# Patient Record
Sex: FEMALE | Race: ASIAN | Employment: OTHER | URBAN - METROPOLITAN AREA
[De-identification: names, ages, dates, MRNs, and addresses within clinical notes are randomized per-mention and may not be internally consistent; named-entity substitution may affect disease eponyms.]

---

## 2017-02-13 PROBLEM — R06.00 DOE (DYSPNEA ON EXERTION): Status: ACTIVE | Noted: 2017-02-13

## 2017-02-13 PROBLEM — R06.09 DOE (DYSPNEA ON EXERTION): Status: ACTIVE | Noted: 2017-02-13

## 2017-02-13 PROBLEM — R09.89 LABILE HYPERTENSION: Status: ACTIVE | Noted: 2017-02-13

## 2017-04-04 PROBLEM — L84 CALLUS OF FOOT: Status: ACTIVE | Noted: 2017-04-04

## 2017-09-27 PROCEDURE — 82570 ASSAY OF URINE CREATININE: CPT | Performed by: INTERNAL MEDICINE

## 2017-09-27 PROCEDURE — 82043 UR ALBUMIN QUANTITATIVE: CPT | Performed by: INTERNAL MEDICINE

## 2018-04-08 ENCOUNTER — APPOINTMENT (OUTPATIENT)
Dept: GENERAL RADIOLOGY | Age: 73
End: 2018-04-08
Attending: EMERGENCY MEDICINE
Payer: MEDICARE

## 2018-04-08 ENCOUNTER — HOSPITAL ENCOUNTER (EMERGENCY)
Age: 73
Discharge: HOME | End: 2018-04-08
Attending: EMERGENCY MEDICINE
Payer: MEDICARE

## 2018-04-08 VITALS
HEIGHT: 59 IN | SYSTOLIC BLOOD PRESSURE: 155 MMHG | DIASTOLIC BLOOD PRESSURE: 67 MMHG | OXYGEN SATURATION: 93 % | BODY MASS INDEX: 24.39 KG/M2 | TEMPERATURE: 99 F | HEART RATE: 88 BPM | WEIGHT: 121 LBS | RESPIRATION RATE: 18 BRPM

## 2018-04-08 DIAGNOSIS — J45.901 EXACERBATION OF ASTHMA, UNSPECIFIED ASTHMA SEVERITY, UNSPECIFIED WHETHER PERSISTENT: Primary | ICD-10-CM

## 2018-04-08 DIAGNOSIS — R03.0 BLOOD PRESSURE ELEVATED WITHOUT HISTORY OF HTN: ICD-10-CM

## 2018-04-08 DIAGNOSIS — E87.1 HYPONATREMIA: ICD-10-CM

## 2018-04-08 PROCEDURE — 87040 BLOOD CULTURE FOR BACTERIA: CPT | Performed by: EMERGENCY MEDICINE

## 2018-04-08 PROCEDURE — 84484 ASSAY OF TROPONIN QUANT: CPT | Performed by: EMERGENCY MEDICINE

## 2018-04-08 PROCEDURE — 94640 AIRWAY INHALATION TREATMENT: CPT

## 2018-04-08 PROCEDURE — 99285 EMERGENCY DEPT VISIT HI MDM: CPT

## 2018-04-08 PROCEDURE — 96374 THER/PROPH/DIAG INJ IV PUSH: CPT

## 2018-04-08 PROCEDURE — 94664 DEMO&/EVAL PT USE INHALER: CPT

## 2018-04-08 PROCEDURE — 85025 COMPLETE CBC W/AUTO DIFF WBC: CPT | Performed by: EMERGENCY MEDICINE

## 2018-04-08 PROCEDURE — 93005 ELECTROCARDIOGRAM TRACING: CPT

## 2018-04-08 PROCEDURE — 83880 ASSAY OF NATRIURETIC PEPTIDE: CPT | Performed by: EMERGENCY MEDICINE

## 2018-04-08 PROCEDURE — 85610 PROTHROMBIN TIME: CPT | Performed by: EMERGENCY MEDICINE

## 2018-04-08 PROCEDURE — 71045 X-RAY EXAM CHEST 1 VIEW: CPT | Performed by: EMERGENCY MEDICINE

## 2018-04-08 PROCEDURE — 85730 THROMBOPLASTIN TIME PARTIAL: CPT | Performed by: EMERGENCY MEDICINE

## 2018-04-08 PROCEDURE — 80053 COMPREHEN METABOLIC PANEL: CPT | Performed by: EMERGENCY MEDICINE

## 2018-04-08 PROCEDURE — 93010 ELECTROCARDIOGRAM REPORT: CPT

## 2018-04-08 PROCEDURE — 36415 COLL VENOUS BLD VENIPUNCTURE: CPT

## 2018-04-08 RX ORDER — METHYLPREDNISOLONE SODIUM SUCCINATE 125 MG/2ML
125 INJECTION, POWDER, LYOPHILIZED, FOR SOLUTION INTRAMUSCULAR; INTRAVENOUS ONCE
Status: COMPLETED | OUTPATIENT
Start: 2018-04-08 | End: 2018-04-08

## 2018-04-08 RX ORDER — IPRATROPIUM BROMIDE AND ALBUTEROL SULFATE 2.5; .5 MG/3ML; MG/3ML
3 SOLUTION RESPIRATORY (INHALATION) ONCE
Status: COMPLETED | OUTPATIENT
Start: 2018-04-08 | End: 2018-04-08

## 2018-04-08 RX ORDER — ACETAMINOPHEN 500 MG
1000 TABLET ORAL ONCE
Status: COMPLETED | OUTPATIENT
Start: 2018-04-08 | End: 2018-04-08

## 2018-04-08 RX ORDER — CEPHALEXIN 500 MG/1
500 CAPSULE ORAL 4 TIMES DAILY
COMMUNITY
End: 2018-04-23

## 2018-04-08 RX ORDER — ALBUTEROL SULFATE 2.5 MG/3ML
2.5 SOLUTION RESPIRATORY (INHALATION) EVERY 4 HOURS PRN
Qty: 30 AMPULE | Refills: 0 | Status: SHIPPED | OUTPATIENT
Start: 2018-04-08 | End: 2018-05-08

## 2018-04-08 NOTE — ED PROVIDER NOTES
Patient Seen in: THE [facility] Emergency Department In [place]    History   Patient presents with:  Dyspnea SARAH SOB (respiratory)    Stated Complaint: shortness of breath    HPI    19-year-old female with history of asthma presents emergency with chief compl Systems    Positive for stated complaint: shortness of breath  Other systems are as noted in HPI. Constitutional and vital signs reviewed. All other systems reviewed and negative except as noted above.     Physical Exam   ED Triage Vitals [04/08/18 15 PROTHROMBIN TIME (PT) - Normal   PTT, ACTIVATED - Normal    Narrative: The aPTT Heparin Therapeutic Range is approximately 65- 104 seconds. The therapeutic range has been validated against 0.3-0.7 heparin anti-Xa units/mL.      CBC WITH DIFFERENTIAL W asthma severity, unspecified whether persistent  (primary encounter diagnosis)  Blood pressure elevated without history of HTN  Hyponatremia    Disposition:  Discharge  4/8/2018  7:28 pm    Follow-up:  Yane Escalante MD  [address]  [address]  [place]

## 2018-04-08 NOTE — ED INITIAL ASSESSMENT (HPI)
Came in today for SOB-- has had some SOB following URI for about 1 month-- worsening over last 3-4 days with wheezing-- also noticed increase BP over last few days

## 2018-07-06 ENCOUNTER — HOSPITAL ENCOUNTER (EMERGENCY)
Age: 73
Discharge: HOME | End: 2018-07-06
Attending: EMERGENCY MEDICINE
Payer: MEDICARE

## 2018-07-06 ENCOUNTER — APPOINTMENT (OUTPATIENT)
Dept: CT IMAGING | Age: 73
End: 2018-07-06
Attending: EMERGENCY MEDICINE
Payer: MEDICARE

## 2018-07-06 ENCOUNTER — APPOINTMENT (OUTPATIENT)
Dept: GENERAL RADIOLOGY | Age: 73
End: 2018-07-06
Attending: EMERGENCY MEDICINE
Payer: MEDICARE

## 2018-07-06 VITALS
BODY MASS INDEX: 26.21 KG/M2 | TEMPERATURE: 99 F | DIASTOLIC BLOOD PRESSURE: 73 MMHG | OXYGEN SATURATION: 98 % | HEIGHT: 59 IN | SYSTOLIC BLOOD PRESSURE: 179 MMHG | RESPIRATION RATE: 15 BRPM | HEART RATE: 70 BPM | WEIGHT: 130 LBS

## 2018-07-06 DIAGNOSIS — S09.90XA INJURY OF HEAD, INITIAL ENCOUNTER: ICD-10-CM

## 2018-07-06 DIAGNOSIS — R55 SYNCOPE AND COLLAPSE: ICD-10-CM

## 2018-07-06 DIAGNOSIS — S40.012A CONTUSION OF LEFT SHOULDER, INITIAL ENCOUNTER: ICD-10-CM

## 2018-07-06 DIAGNOSIS — S93.409A MODERATE ANKLE SPRAIN, UNSPECIFIED LATERALITY, INITIAL ENCOUNTER: Primary | ICD-10-CM

## 2018-07-06 LAB
ALBUMIN SERPL-MCNC: 3.2 G/DL (ref 3.5–4.8)
ALP LIVER SERPL-CCNC: 87 U/L (ref 55–142)
ALT SERPL-CCNC: 36 U/L (ref 14–54)
AST SERPL-CCNC: 33 U/L (ref 15–41)
ATRIAL RATE: 77 BPM
BASOPHILS # BLD AUTO: 0.03 X10(3) UL (ref 0–0.1)
BASOPHILS NFR BLD AUTO: 0.6 %
BILIRUB SERPL-MCNC: 0.3 MG/DL (ref 0.1–2)
BUN BLD-MCNC: 12 MG/DL (ref 8–20)
CALCIUM BLD-MCNC: 8.3 MG/DL (ref 8.3–10.3)
CHLORIDE: 97 MMOL/L (ref 101–111)
CO2: 29 MMOL/L (ref 22–32)
CREAT BLD-MCNC: 0.73 MG/DL (ref 0.55–1.02)
EOSINOPHIL # BLD AUTO: 0.09 X10(3) UL (ref 0–0.3)
EOSINOPHIL NFR BLD AUTO: 1.9 %
ERYTHROCYTE [DISTWIDTH] IN BLOOD BY AUTOMATED COUNT: 13.6 % (ref 11.5–16)
GLUCOSE BLD-MCNC: 110 MG/DL (ref 70–99)
HCT VFR BLD AUTO: 30.8 % (ref 34–50)
HGB BLD-MCNC: 9.9 G/DL (ref 12–16)
IMMATURE GRANULOCYTE COUNT: 0.01 X10(3) UL (ref 0–1)
IMMATURE GRANULOCYTE RATIO %: 0.2 %
LYMPHOCYTES # BLD AUTO: 0.89 X10(3) UL (ref 0.9–4)
LYMPHOCYTES NFR BLD AUTO: 18.9 %
M PROTEIN MFR SERPL ELPH: 6.3 G/DL (ref 6.1–8.3)
MCH RBC QN AUTO: 26.2 PG (ref 27–33.2)
MCHC RBC AUTO-ENTMCNC: 32.1 G/DL (ref 31–37)
MCV RBC AUTO: 81.5 FL (ref 81–100)
MONOCYTES # BLD AUTO: 0.61 X10(3) UL (ref 0.1–1)
MONOCYTES NFR BLD AUTO: 13 %
NEUTROPHIL ABS PRELIM: 3.08 X10 (3) UL (ref 1.3–6.7)
NEUTROPHILS # BLD AUTO: 3.08 X10(3) UL (ref 1.3–6.7)
NEUTROPHILS NFR BLD AUTO: 65.4 %
P AXIS: 71 DEGREES
P-R INTERVAL: 164 MS
PLATELET # BLD AUTO: 212 10(3)UL (ref 150–450)
POTASSIUM SERPL-SCNC: 4.4 MMOL/L (ref 3.6–5.1)
Q-T INTERVAL: 386 MS
QRS DURATION: 74 MS
QTC CALCULATION (BEZET): 436 MS
R AXIS: 39 DEGREES
RBC # BLD AUTO: 3.78 X10(6)UL (ref 3.8–5.1)
RED CELL DISTRIBUTION WIDTH-SD: 40.5 FL (ref 35.1–46.3)
SODIUM SERPL-SCNC: 130 MMOL/L (ref 136–144)
T AXIS: 56 DEGREES
TROPONIN: <0.046 NG/ML (ref ?–0.05)
VENTRICULAR RATE: 77 BPM
WBC # BLD AUTO: 4.7 X10(3) UL (ref 4–13)

## 2018-07-06 PROCEDURE — 36415 COLL VENOUS BLD VENIPUNCTURE: CPT

## 2018-07-06 PROCEDURE — 70450 CT HEAD/BRAIN W/O DYE: CPT | Performed by: EMERGENCY MEDICINE

## 2018-07-06 PROCEDURE — 85025 COMPLETE CBC W/AUTO DIFF WBC: CPT | Performed by: EMERGENCY MEDICINE

## 2018-07-06 PROCEDURE — 99285 EMERGENCY DEPT VISIT HI MDM: CPT

## 2018-07-06 PROCEDURE — 93010 ELECTROCARDIOGRAM REPORT: CPT

## 2018-07-06 PROCEDURE — 84484 ASSAY OF TROPONIN QUANT: CPT

## 2018-07-06 PROCEDURE — 85025 COMPLETE CBC W/AUTO DIFF WBC: CPT

## 2018-07-06 PROCEDURE — 84484 ASSAY OF TROPONIN QUANT: CPT | Performed by: EMERGENCY MEDICINE

## 2018-07-06 PROCEDURE — 80053 COMPREHEN METABOLIC PANEL: CPT

## 2018-07-06 PROCEDURE — 73610 X-RAY EXAM OF ANKLE: CPT | Performed by: EMERGENCY MEDICINE

## 2018-07-06 PROCEDURE — 73030 X-RAY EXAM OF SHOULDER: CPT | Performed by: EMERGENCY MEDICINE

## 2018-07-06 PROCEDURE — 93005 ELECTROCARDIOGRAM TRACING: CPT

## 2018-07-06 PROCEDURE — 80053 COMPREHEN METABOLIC PANEL: CPT | Performed by: EMERGENCY MEDICINE

## 2018-07-06 NOTE — ED INITIAL ASSESSMENT (HPI)
FELL THREE DAYS AGO WHILE OUT OF COUNTRY. STATES WAS OUTSIDE AND HAD A POSSIBLE SYNCOPAL EPISODE. REPORTS LEFT SHOULDER, RIGHT FOOT AND ANKLE PAIN AND CONTINUED FEELING OF TIRED.

## 2018-07-06 NOTE — ED PROVIDER NOTES
Patient Seen in: [facility] Emergency Department In [place]    History   Patient presents with:  Fall (musculoskeletal, neurologic)    Stated Complaint: fall 3 days ago - possible syncope    HPI    28-year-old female presents to the emergency room reporti SURGICAL HISTORY      Comment: left knee  No date: TOTAL ABDOM HYSTERECTOMY        Smoking status: Never Smoker                                                              Smokeless tobacco: Never Used                      Alcohol use:  No                R CBC W/ DIFFERENTIAL - Abnormal; Notable for the following:     RBC 3.78 (*)     HGB 9.9 (*)     HCT 30.8 (*)     MCH 26.2 (*)     Lymphocyte Absolute 0.89 (*)     All other components within normal limits   TROPONIN I - Normal   CBC WITH DIFFERENTIAL WIT on 7/06/2018 at 13:34     Approved by: Patricia Beauchamp MD            Xr Ankle (min 3 Views), Right (cpt=73610)    Result Date: 7/6/2018  PROCEDURE:  XR ANKLE (MIN 3 VIEWS) RIGHT (CPT=73610)  TECHNIQUE:  Three views were obtained.   COMPARISON:  JUAN mild degree. No soft tissue abnormalities. CONCLUSION:  Mild osteoarthritic changes are present. No acute fracture or other acute bony process.    Dictated by: Velma Lowe MD on 7/06/2018 at 13:37     Approved by: Velma Lowe MD            Ct mild hyponatremia. Patient instructed to follow-up with PCP to have this reevaluated in 1 week. No concerning finding on labs, imaging, examination. Patient was improving to [place] on [place] beach feeling overheated when she then lost consciousness.   No e

## 2019-06-30 ENCOUNTER — HOSPITAL ENCOUNTER (OUTPATIENT)
Dept: ULTRASOUND IMAGING | Age: 74
Discharge: HOME | End: 2019-06-30
Attending: INTERNAL MEDICINE
Payer: MEDICARE

## 2019-06-30 DIAGNOSIS — E04.9 GOITER: ICD-10-CM

## 2019-06-30 PROCEDURE — 76536 US EXAM OF HEAD AND NECK: CPT | Performed by: INTERNAL MEDICINE

## 2020-02-18 PROBLEM — R09.89 LABILE HYPERTENSION: Status: RESOLVED | Noted: 2017-02-13 | Resolved: 2020-02-18

## 2020-06-19 ENCOUNTER — HOSPITAL ENCOUNTER (OUTPATIENT)
Dept: MRI IMAGING | Age: 75
Discharge: HOME | End: 2020-06-19
Attending: ORTHOPAEDIC SURGERY
Payer: MEDICARE

## 2020-06-19 DIAGNOSIS — M75.121 COMPLETE ROTATOR CUFF TEAR OR RUPTURE OF RIGHT SHOULDER, NOT SPECIFIED AS TRAUMATIC: ICD-10-CM

## 2020-06-19 DIAGNOSIS — M54.12 BRACHIAL NEURITIS: ICD-10-CM

## 2020-06-19 PROCEDURE — 73221 MRI JOINT UPR EXTREM W/O DYE: CPT | Performed by: ORTHOPAEDIC SURGERY

## 2020-06-19 PROCEDURE — 72141 MRI NECK SPINE W/O DYE: CPT | Performed by: ORTHOPAEDIC SURGERY

## 2020-11-25 PROBLEM — F41.9 ANXIETY: Status: ACTIVE | Noted: 2020-11-25

## 2020-11-25 PROBLEM — E11.41 DIABETIC MONONEUROPATHY ASSOCIATED WITH TYPE 2 DIABETES MELLITUS (HCC): Status: ACTIVE | Noted: 2020-11-25

## 2021-04-06 ENCOUNTER — ORDER TRANSCRIPTION (OUTPATIENT)
Dept: ADMINISTRATIVE | Facility: HOSPITAL | Age: 76
End: 2021-04-06

## 2021-04-06 DIAGNOSIS — E78.2 MIXED HYPERLIPIDEMIA: ICD-10-CM

## 2021-04-06 DIAGNOSIS — R06.00 DYSPNEA ON EXERTION: ICD-10-CM

## 2021-04-06 DIAGNOSIS — R09.89 LABILE HYPERTENSION: Primary | ICD-10-CM

## 2021-05-04 ENCOUNTER — APPOINTMENT (OUTPATIENT)
Dept: GENERAL RADIOLOGY | Facility: HOSPITAL | Age: 76
End: 2021-05-04
Attending: EMERGENCY MEDICINE
Payer: MEDICARE

## 2021-05-04 ENCOUNTER — HOSPITAL ENCOUNTER (EMERGENCY)
Facility: HOSPITAL | Age: 76
Discharge: HOME | End: 2021-05-04
Attending: EMERGENCY MEDICINE
Payer: MEDICARE

## 2021-05-04 ENCOUNTER — APPOINTMENT (OUTPATIENT)
Dept: CT IMAGING | Facility: HOSPITAL | Age: 76
End: 2021-05-04
Attending: EMERGENCY MEDICINE
Payer: MEDICARE

## 2021-05-04 VITALS
DIASTOLIC BLOOD PRESSURE: 64 MMHG | RESPIRATION RATE: 20 BRPM | SYSTOLIC BLOOD PRESSURE: 120 MMHG | OXYGEN SATURATION: 96 % | WEIGHT: 125 LBS | TEMPERATURE: 98 F | BODY MASS INDEX: 25 KG/M2 | HEART RATE: 72 BPM

## 2021-05-04 DIAGNOSIS — R06.02 SHORTNESS OF BREATH: Primary | ICD-10-CM

## 2021-05-04 DIAGNOSIS — G44.209 ACUTE NON INTRACTABLE TENSION-TYPE HEADACHE: ICD-10-CM

## 2021-05-04 DIAGNOSIS — R53.83 FATIGUE, UNSPECIFIED TYPE: ICD-10-CM

## 2021-05-04 PROCEDURE — 84443 ASSAY THYROID STIM HORMONE: CPT | Performed by: EMERGENCY MEDICINE

## 2021-05-04 PROCEDURE — 93010 ELECTROCARDIOGRAM REPORT: CPT

## 2021-05-04 PROCEDURE — 71045 X-RAY EXAM CHEST 1 VIEW: CPT | Performed by: EMERGENCY MEDICINE

## 2021-05-04 PROCEDURE — 99284 EMERGENCY DEPT VISIT MOD MDM: CPT

## 2021-05-04 PROCEDURE — 93005 ELECTROCARDIOGRAM TRACING: CPT

## 2021-05-04 PROCEDURE — 85379 FIBRIN DEGRADATION QUANT: CPT | Performed by: EMERGENCY MEDICINE

## 2021-05-04 PROCEDURE — 81003 URINALYSIS AUTO W/O SCOPE: CPT | Performed by: EMERGENCY MEDICINE

## 2021-05-04 PROCEDURE — 70450 CT HEAD/BRAIN W/O DYE: CPT | Performed by: EMERGENCY MEDICINE

## 2021-05-04 PROCEDURE — 99285 EMERGENCY DEPT VISIT HI MDM: CPT

## 2021-05-04 PROCEDURE — 85025 COMPLETE CBC W/AUTO DIFF WBC: CPT | Performed by: EMERGENCY MEDICINE

## 2021-05-04 PROCEDURE — 80053 COMPREHEN METABOLIC PANEL: CPT | Performed by: EMERGENCY MEDICINE

## 2021-05-04 PROCEDURE — 84484 ASSAY OF TROPONIN QUANT: CPT | Performed by: EMERGENCY MEDICINE

## 2021-05-04 RX ORDER — ACETAMINOPHEN 325 MG/1
650 TABLET ORAL ONCE
Status: COMPLETED | OUTPATIENT
Start: 2021-05-04 | End: 2021-05-04

## 2021-05-04 NOTE — ED PROVIDER NOTES
Patient Seen in: [facility] Emergency Department      History   Patient presents with:  Difficulty Breathing    Stated Complaint: SOB, increasing over the last few weeks    HPI/Subjective:   HPI    41-year-old female presents to the emergency depart - DMG  6/13    irregular Z line   • HYSTERECTOMY  2001    Complete    • OTHER SURGICAL HISTORY      left knee   • TOTAL ABDOM HYSTERECTOMY                  Social History    Tobacco Use      Smoking status: Never Smoker      Smokeless tobacco: Never Used to person, place, and time. Cranial Nerves: No cranial nerve deficit. Sensory: No sensory deficit. Coordination: Coordination normal.      Deep Tendon Reflexes: Reflexes are normal and symmetric.               ED Course     Labs Reviewed   CO is transmitted to the [facility] ([facility]) [address] ([address]) which includes the Dose Index Registry.   PATIENT STATED HISTORY: (As transcribed by Technologist)  Headache    FINDINGS:  VENTRICLES/SULCI:  Ventricles and patrick ID, ED, PLAX                           4.0   cm     ---------- 3.9 - 5.3  LV ID, ES, PLAX                           2.8   cm     ---------- ---------  LV fx shortening, PLAX                    29    %      ---------- 27 - 45  LV mid-wall fx shortening, ANGELIC 0.74  m/sec  0.81       ---------  Mitral deceleration time                  148   ms     194        ---------  Mitral peak gradient, D                   5     mm Hg  ---------- ---------  Mitral E/A ratio, peak                    1.6 annulus. Mildly thickened leaflets. Doppler:  Transvalvular velocity is within the normal range. There is no evidence for stenosis. There is mild regurgitation. Tricuspid valve:   Structurally normal valve. Doppler: There is no evidence for stenosis. AP PORTABLE  (CPT=71045), 4/08/2018, 3:55 PM.  INDICATIONS:  SOB, increasing over the last few weeks  PATIENT STATED HISTORY: (As transcribed by Technologist)  Patient offered no additional history at this time.     FINDINGS:  Cardiac size and pulmonary vas acidosis is the etiology for any dyspnea. At this time I do not feel she requires emergent hospitalization.   Patient was discharged in good condition                             Disposition and Plan     Clinical Impression:  Shortness of breath  (primary

## 2021-05-17 NOTE — IMAGING NOTE
Pt scheduled for CT gated coronary. Discussed arrival time 0830. Instructed to hold all caffeine, eat meals, hydrate well with water, and take all prescribed meds. Pt verbalized understanding of all instructions.

## 2021-05-19 ENCOUNTER — HOSPITAL ENCOUNTER (OUTPATIENT)
Dept: CT IMAGING | Facility: HOSPITAL | Age: 76
Discharge: HOME | End: 2021-05-19
Attending: INTERNAL MEDICINE
Payer: MEDICARE

## 2021-05-19 VITALS — HEART RATE: 65 BPM | SYSTOLIC BLOOD PRESSURE: 105 MMHG | DIASTOLIC BLOOD PRESSURE: 61 MMHG

## 2021-05-19 VITALS — HEART RATE: 58 BPM | SYSTOLIC BLOOD PRESSURE: 136 MMHG | DIASTOLIC BLOOD PRESSURE: 55 MMHG

## 2021-05-19 DIAGNOSIS — R09.89 LABILE HYPERTENSION: ICD-10-CM

## 2021-05-19 DIAGNOSIS — R06.02 SHORTNESS OF BREATH: ICD-10-CM

## 2021-05-19 DIAGNOSIS — R07.9 INTERMITTENT CHEST PAIN: ICD-10-CM

## 2021-05-19 DIAGNOSIS — I34.0 NONRHEUMATIC MITRAL VALVE REGURGITATION: ICD-10-CM

## 2021-05-19 DIAGNOSIS — R06.00 DYSPNEA ON EXERTION: ICD-10-CM

## 2021-05-19 DIAGNOSIS — E78.2 MIXED HYPERLIPIDEMIA: ICD-10-CM

## 2021-05-19 DIAGNOSIS — R07.9 CHEST PAIN, UNSPECIFIED TYPE: ICD-10-CM

## 2021-05-19 PROCEDURE — 75574 CT ANGIO HRT W/3D IMAGE: CPT | Performed by: INTERNAL MEDICINE

## 2021-05-19 RX ORDER — DILTIAZEM HYDROCHLORIDE 5 MG/ML
INJECTION INTRAVENOUS
Status: DISCONTINUED
Start: 2021-05-19 | End: 2021-05-19 | Stop reason: WASHOUT

## 2021-05-19 RX ORDER — NITROGLYCERIN 0.4 MG/1
TABLET SUBLINGUAL
Status: COMPLETED
Start: 2021-05-19 | End: 2021-05-19

## 2021-05-19 RX ORDER — METOPROLOL TARTRATE 5 MG/5ML
INJECTION INTRAVENOUS
Status: COMPLETED
Start: 2021-05-19 | End: 2021-05-19

## 2021-05-19 RX ADMIN — METOPROLOL TARTRATE 5 MG: 5 INJECTION INTRAVENOUS at 09:56:00

## 2021-05-19 RX ADMIN — METOPROLOL TARTRATE 5 MG: 5 INJECTION INTRAVENOUS at 09:50:00

## 2021-05-19 RX ADMIN — NITROGLYCERIN 0.4 MG: 0.4 TABLET SUBLINGUAL at 10:05:00

## 2021-05-19 NOTE — IMAGING NOTE
Received Rahul Amaro Plunk to CT Rm 4 working with [address] [employer]. Verified name, , allergies. Pt denies use of long acting nitrates like, Imdur, Cialis, Levitra and Viagra. IV access with 20G angiocath to right antecubital area.  O2 applied via nasal can

## 2021-06-23 PROBLEM — H90.3 SENSORY HEARING LOSS, BILATERAL: Status: ACTIVE | Noted: 2021-06-23

## 2021-06-23 PROBLEM — H61.23 IMPACTED CERUMEN, BILATERAL: Status: ACTIVE | Noted: 2021-06-23

## 2021-06-26 ENCOUNTER — HOSPITAL ENCOUNTER (OUTPATIENT)
Dept: CT IMAGING | Facility: HOSPITAL | Age: 76
Discharge: HOME | End: 2021-06-26
Attending: INTERNAL MEDICINE
Payer: MEDICARE

## 2021-06-26 ENCOUNTER — LAB ENCOUNTER (OUTPATIENT)
Dept: LAB | Facility: HOSPITAL | Age: 76
End: 2021-06-26
Attending: INTERNAL MEDICINE
Payer: MEDICARE

## 2021-06-26 DIAGNOSIS — R06.00 DYSPNEA, UNSPECIFIED TYPE: ICD-10-CM

## 2021-06-26 DIAGNOSIS — R09.89 LABILE HYPERTENSION: ICD-10-CM

## 2021-06-26 PROCEDURE — 36415 COLL VENOUS BLD VENIPUNCTURE: CPT

## 2021-06-26 PROCEDURE — 80048 BASIC METABOLIC PNL TOTAL CA: CPT

## 2021-06-26 PROCEDURE — 71250 CT THORAX DX C-: CPT | Performed by: INTERNAL MEDICINE

## 2021-12-08 ENCOUNTER — HOSPITAL ENCOUNTER (EMERGENCY)
Age: 76
Discharge: HOME | End: 2021-12-08
Attending: EMERGENCY MEDICINE
Payer: MEDICARE

## 2021-12-08 ENCOUNTER — APPOINTMENT (OUTPATIENT)
Dept: CT IMAGING | Age: 76
End: 2021-12-08
Attending: PHYSICIAN ASSISTANT
Payer: MEDICARE

## 2021-12-08 VITALS
HEIGHT: 59 IN | HEART RATE: 64 BPM | TEMPERATURE: 98 F | BODY MASS INDEX: 25.2 KG/M2 | DIASTOLIC BLOOD PRESSURE: 69 MMHG | SYSTOLIC BLOOD PRESSURE: 144 MMHG | RESPIRATION RATE: 16 BRPM | OXYGEN SATURATION: 99 % | WEIGHT: 125 LBS

## 2021-12-08 DIAGNOSIS — N39.0 URINARY TRACT INFECTION WITH HEMATURIA, SITE UNSPECIFIED: Primary | ICD-10-CM

## 2021-12-08 DIAGNOSIS — N21.1 URETHRAL STONE: ICD-10-CM

## 2021-12-08 DIAGNOSIS — E87.1 HYPONATREMIA: ICD-10-CM

## 2021-12-08 DIAGNOSIS — R31.9 URINARY TRACT INFECTION WITH HEMATURIA, SITE UNSPECIFIED: Primary | ICD-10-CM

## 2021-12-08 PROCEDURE — 81015 MICROSCOPIC EXAM OF URINE: CPT | Performed by: PHYSICIAN ASSISTANT

## 2021-12-08 PROCEDURE — 87086 URINE CULTURE/COLONY COUNT: CPT | Performed by: PHYSICIAN ASSISTANT

## 2021-12-08 PROCEDURE — 81001 URINALYSIS AUTO W/SCOPE: CPT | Performed by: PHYSICIAN ASSISTANT

## 2021-12-08 PROCEDURE — 99284 EMERGENCY DEPT VISIT MOD MDM: CPT

## 2021-12-08 PROCEDURE — 96361 HYDRATE IV INFUSION ADD-ON: CPT

## 2021-12-08 PROCEDURE — 74177 CT ABD & PELVIS W/CONTRAST: CPT | Performed by: PHYSICIAN ASSISTANT

## 2021-12-08 PROCEDURE — 85025 COMPLETE CBC W/AUTO DIFF WBC: CPT | Performed by: PHYSICIAN ASSISTANT

## 2021-12-08 PROCEDURE — 96365 THER/PROPH/DIAG IV INF INIT: CPT

## 2021-12-08 PROCEDURE — 82962 GLUCOSE BLOOD TEST: CPT

## 2021-12-08 PROCEDURE — 80053 COMPREHEN METABOLIC PANEL: CPT | Performed by: PHYSICIAN ASSISTANT

## 2021-12-08 RX ORDER — SODIUM CHLORIDE 9 MG/ML
INJECTION, SOLUTION INTRAVENOUS CONTINUOUS
Status: DISCONTINUED | OUTPATIENT
Start: 2021-12-08 | End: 2021-12-08

## 2021-12-08 RX ORDER — SULFAMETHOXAZOLE AND TRIMETHOPRIM 800; 160 MG/1; MG/1
1 TABLET ORAL 2 TIMES DAILY
Qty: 20 TABLET | Refills: 0 | Status: SHIPPED | OUTPATIENT
Start: 2021-12-08 | End: 2021-12-18

## 2021-12-08 NOTE — ED NOTES
HERDMG-LOS SCREEN MAMMOGRAM,DIGITAL(CPT=77067)    Result Date: 11/18/2021  DATE OF SERVICE: 11.16.2021 BILATERAL SCREENING MAMMOGRAM WITH CAD WITH TOMOSYNTHESIS     Result Date: 12/8/2021  PROCEDURE:  CT ABDOMEN PELVIS IV CONTRAST, NO ORAL (ER)  COMPARISO ORGANS:  No visible mass. Pelvic organs appropriate for patient age. BONES:  No bony lesion or fracture. Mild to moderate degenerative changes of the lumbar spine. LUNG BASES:  No visible pulmonary or pleural disease. OTHER:  Negative.              CONC

## 2021-12-08 NOTE — ED QUICK NOTES
Pt ready for dc home, pt informed of f/u and dc instructions, iv removed area intact, pt able to ambulate out.

## 2021-12-08 NOTE — ED INITIAL ASSESSMENT (HPI)
To er with c/o urinary urgency and frequency for past 7-8 days. States today sx are worse and now has hematuria.    Has not started cipro that was ordered

## 2021-12-08 NOTE — ED NOTES
I reviewed that chart and discussed the case. I have examined the patient and noted the patient arrived here with urinary frequency for the last 7 to 8 days. But now she is got hematuria. The patient has had no fevers no vomiting, diarrhea.   The pain sh daughter who is actually a physician there will have her follow-up as an outpatient.   I discussed that she needs a repeat sodium in the next several daysHERDMG-LOS SCREEN MAMMOGRAM,DIGITAL(CPT=77067)    CT ABDOMEN PELVIS IV CONTRAST, NO ORAL (ER)    Resul may be a passing small stone measuring 2 mm. PELVIC NODES:  No adenopathy. PELVIC ORGANS:  No visible mass. Pelvic organs appropriate for patient age. BONES:  No bony lesion or fracture. Mild to moderate degenerative changes of the lumbar spine.  LUNG B

## 2021-12-08 NOTE — ED PROVIDER NOTES
Patient Seen in: THE [facility] Emergency Department In [place]      History   Patient presents with:  Urinary Symptoms    Stated Complaint:     Subjective:   HPI    54-year-old female. She has had recent difficulty with recurrent urinary symptoms.   In the l reviewed. All other systems reviewed and negative except as noted above.     Physical Exam     ED Triage Vitals [12/08/21 1104]   /67   Pulse 56   Resp 16   Temp 97.6 °F (36.4 °C)   Temp src Temporal   SpO2 100 %   O2 Device None (Room air) URINE CULTURE, ROUTINE          HERDMG-LOS SCREEN MAMMOGRAM,DIGITAL(CPT=77067)    Result Date: 11/18/2021  DATE OF SERVICE: 11.16.2021 BILATERAL SCREENING MAMMOGRAM WITH CAD WITH TOMOSYNTHESIS CLINICAL INDICATION:  67years old woman.   Screening mammogr y.o female  TECHNIQUE:  CT scanning was performed from the dome of the diaphragm to the pubic symphysis with non-ionic intravenous contrast material. Post contrast coronal MPR imaging was performed. Dose reduction techniques were used.  Dose information is causing mild prominence of the bilateral collecting systems. This is likely due to physiologic dilatation. There is a tiny calcification measuring 2-3 mm at the midline in the expected location of  the urethra could represent a passing stone.     Dictated (two) times daily for 10 days.   Qty: 20 tablet Refills: 0

## 2022-02-08 PROBLEM — J44.9 CHRONIC OBSTRUCTIVE PULMONARY DISEASE, UNSPECIFIED COPD TYPE (HCC): Status: ACTIVE | Noted: 2022-02-08

## 2022-02-25 ENCOUNTER — HOSPITAL ENCOUNTER (OUTPATIENT)
Dept: CT IMAGING | Facility: HOSPITAL | Age: 77
Discharge: HOME | End: 2022-02-25
Attending: INTERNAL MEDICINE
Payer: MEDICARE

## 2022-02-25 DIAGNOSIS — N02.9 FAMILIAL HEMATURIA: ICD-10-CM

## 2022-02-25 DIAGNOSIS — N02.9 RECURRENT HEMATURIA: ICD-10-CM

## 2022-02-25 PROCEDURE — 74178 CT ABD&PLV WO CNTR FLWD CNTR: CPT | Performed by: INTERNAL MEDICINE

## 2022-02-25 RX ORDER — IOHEXOL 350 MG/ML
75 INJECTION, SOLUTION INTRAVENOUS
Status: COMPLETED | OUTPATIENT
Start: 2022-02-25 | End: 2022-02-25

## 2022-02-25 RX ADMIN — IOHEXOL 75 ML: 350 INJECTION, SOLUTION INTRAVENOUS at 17:50:00

## 2023-07-04 ENCOUNTER — APPOINTMENT (OUTPATIENT)
Dept: CT IMAGING | Facility: HOSPITAL | Age: 78
End: 2023-07-04
Attending: STUDENT IN AN ORGANIZED HEALTH CARE EDUCATION/TRAINING PROGRAM
Payer: MEDICARE

## 2023-07-04 ENCOUNTER — HOSPITAL ENCOUNTER (INPATIENT)
Facility: HOSPITAL | Age: 78
LOS: 3 days | Discharge: HOME HEALTH | End: 2023-07-07
Attending: STUDENT IN AN ORGANIZED HEALTH CARE EDUCATION/TRAINING PROGRAM | Admitting: HOSPITALIST
Payer: MEDICARE

## 2023-07-04 ENCOUNTER — APPOINTMENT (OUTPATIENT)
Dept: GENERAL RADIOLOGY | Facility: HOSPITAL | Age: 78
End: 2023-07-04
Attending: STUDENT IN AN ORGANIZED HEALTH CARE EDUCATION/TRAINING PROGRAM
Payer: MEDICARE

## 2023-07-04 ENCOUNTER — HOSPITAL ENCOUNTER (INPATIENT)
Facility: HOSPITAL | Age: 78
LOS: 3 days | Discharge: HOME | End: 2023-07-07
Attending: STUDENT IN AN ORGANIZED HEALTH CARE EDUCATION/TRAINING PROGRAM | Admitting: HOSPITALIST
Payer: MEDICARE

## 2023-07-04 DIAGNOSIS — E87.1 HYPONATREMIA: ICD-10-CM

## 2023-07-04 DIAGNOSIS — I48.91 ATRIAL FIBRILLATION WITH RAPID VENTRICULAR RESPONSE (HCC): Primary | ICD-10-CM

## 2023-07-04 LAB
ALBUMIN SERPL-MCNC: 3.6 G/DL (ref 3.4–5)
ALBUMIN/GLOB SERPL: 1.1 {RATIO} (ref 1–2)
ALP LIVER SERPL-CCNC: 101 U/L
ALT SERPL-CCNC: 16 U/L
ANION GAP SERPL CALC-SCNC: 6 MMOL/L (ref 0–18)
ANION GAP SERPL CALC-SCNC: 8 MMOL/L (ref 0–18)
AST SERPL-CCNC: 7 U/L (ref 15–37)
BASOPHILS # BLD AUTO: 0.02 X10(3) UL (ref 0–0.2)
BASOPHILS NFR BLD AUTO: 0.2 %
BILIRUB SERPL-MCNC: 0.4 MG/DL (ref 0.1–2)
BILIRUB UR QL STRIP.AUTO: NEGATIVE
BUN BLD-MCNC: 14 MG/DL (ref 7–18)
BUN BLD-MCNC: 16 MG/DL (ref 7–18)
CALCIUM BLD-MCNC: 8.5 MG/DL (ref 8.5–10.1)
CALCIUM BLD-MCNC: 8.6 MG/DL (ref 8.5–10.1)
CHLORIDE SERPL-SCNC: 82 MMOL/L (ref 98–112)
CHLORIDE SERPL-SCNC: 90 MMOL/L (ref 98–112)
CLARITY UR REFRACT.AUTO: CLEAR
CO2 SERPL-SCNC: 26 MMOL/L (ref 21–32)
CO2 SERPL-SCNC: 27 MMOL/L (ref 21–32)
COLOR UR AUTO: COLORLESS
CREAT BLD-MCNC: 0.8 MG/DL
CREAT BLD-MCNC: 0.94 MG/DL
EOSINOPHIL # BLD AUTO: 0 X10(3) UL (ref 0–0.7)
EOSINOPHIL NFR BLD AUTO: 0 %
ERYTHROCYTE [DISTWIDTH] IN BLOOD BY AUTOMATED COUNT: 13.1 %
GFR SERPLBLD BASED ON 1.73 SQ M-ARVRAT: 62 ML/MIN/1.73M2 (ref 60–?)
GFR SERPLBLD BASED ON 1.73 SQ M-ARVRAT: 75 ML/MIN/1.73M2 (ref 60–?)
GLOBULIN PLAS-MCNC: 3.2 G/DL (ref 2.8–4.4)
GLUCOSE BLD-MCNC: 179 MG/DL (ref 70–99)
GLUCOSE BLD-MCNC: 211 MG/DL (ref 70–99)
GLUCOSE BLD-MCNC: 215 MG/DL (ref 70–99)
GLUCOSE UR STRIP.AUTO-MCNC: 50 MG/DL
HCT VFR BLD AUTO: 35.9 %
HGB BLD-MCNC: 11.8 G/DL
IMM GRANULOCYTES # BLD AUTO: 0.08 X10(3) UL (ref 0–1)
IMM GRANULOCYTES NFR BLD: 0.6 %
KETONES UR STRIP.AUTO-MCNC: NEGATIVE MG/DL
LYMPHOCYTES # BLD AUTO: 1.32 X10(3) UL (ref 1–4)
LYMPHOCYTES NFR BLD AUTO: 10.1 %
MAGNESIUM SERPL-MCNC: 1.7 MG/DL (ref 1.6–2.6)
MCH RBC QN AUTO: 25.6 PG (ref 26–34)
MCHC RBC AUTO-ENTMCNC: 32.9 G/DL (ref 31–37)
MCV RBC AUTO: 77.9 FL
MONOCYTES # BLD AUTO: 0.96 X10(3) UL (ref 0.1–1)
MONOCYTES NFR BLD AUTO: 7.4 %
NEUTROPHILS # BLD AUTO: 10.64 X10 (3) UL (ref 1.5–7.7)
NEUTROPHILS # BLD AUTO: 10.64 X10(3) UL (ref 1.5–7.7)
NEUTROPHILS NFR BLD AUTO: 81.7 %
NITRITE UR QL STRIP.AUTO: NEGATIVE
OSMOLALITY SERPL CALC.SUM OF ELEC: 252 MOSM/KG (ref 275–295)
OSMOLALITY SERPL CALC.SUM OF ELEC: 259 MOSM/KG (ref 275–295)
OSMOLALITY SERPL: 301 MOSM/KG (ref 280–300)
OSMOLALITY UR: 157 MOSM/KG (ref 300–1300)
PH UR STRIP.AUTO: 8 [PH] (ref 5–8)
PLATELET # BLD AUTO: 248 10(3)UL (ref 150–450)
POTASSIUM SERPL-SCNC: 4 MMOL/L (ref 3.5–5.1)
POTASSIUM SERPL-SCNC: 4.2 MMOL/L (ref 3.5–5.1)
PROT SERPL-MCNC: 6.8 G/DL (ref 6.4–8.2)
PROT UR STRIP.AUTO-MCNC: 100 MG/DL
RBC # BLD AUTO: 4.61 X10(6)UL
RBC UR QL AUTO: NEGATIVE
SODIUM SERPL-SCNC: 117 MMOL/L (ref 136–145)
SODIUM SERPL-SCNC: 122 MMOL/L (ref 136–145)
SODIUM SERPL-SCNC: 122 MMOL/L (ref 136–145)
SODIUM SERPL-SCNC: 126 MMOL/L (ref 136–145)
SODIUM SERPL-SCNC: 26 MMOL/L
SP GR UR STRIP.AUTO: 1.01 (ref 1–1.03)
TROPONIN I HIGH SENSITIVITY: 24 NG/L
TROPONIN I HIGH SENSITIVITY: 26 NG/L
TROPONIN I HIGH SENSITIVITY: 29 NG/L
TSI SER-ACNC: 0.47 MIU/ML (ref 0.36–3.74)
UROBILINOGEN UR STRIP.AUTO-MCNC: <2 MG/DL
WBC # BLD AUTO: 13 X10(3) UL (ref 4–11)

## 2023-07-04 PROCEDURE — 87086 URINE CULTURE/COLONY COUNT: CPT | Performed by: STUDENT IN AN ORGANIZED HEALTH CARE EDUCATION/TRAINING PROGRAM

## 2023-07-04 PROCEDURE — 99291 CRITICAL CARE FIRST HOUR: CPT

## 2023-07-04 PROCEDURE — 96376 TX/PRO/DX INJ SAME DRUG ADON: CPT

## 2023-07-04 PROCEDURE — 83735 ASSAY OF MAGNESIUM: CPT | Performed by: STUDENT IN AN ORGANIZED HEALTH CARE EDUCATION/TRAINING PROGRAM

## 2023-07-04 PROCEDURE — 84484 ASSAY OF TROPONIN QUANT: CPT | Performed by: HOSPITALIST

## 2023-07-04 PROCEDURE — 83930 ASSAY OF BLOOD OSMOLALITY: CPT | Performed by: STUDENT IN AN ORGANIZED HEALTH CARE EDUCATION/TRAINING PROGRAM

## 2023-07-04 PROCEDURE — 84443 ASSAY THYROID STIM HORMONE: CPT | Performed by: STUDENT IN AN ORGANIZED HEALTH CARE EDUCATION/TRAINING PROGRAM

## 2023-07-04 PROCEDURE — 84300 ASSAY OF URINE SODIUM: CPT | Performed by: STUDENT IN AN ORGANIZED HEALTH CARE EDUCATION/TRAINING PROGRAM

## 2023-07-04 PROCEDURE — 70450 CT HEAD/BRAIN W/O DYE: CPT | Performed by: STUDENT IN AN ORGANIZED HEALTH CARE EDUCATION/TRAINING PROGRAM

## 2023-07-04 PROCEDURE — 84484 ASSAY OF TROPONIN QUANT: CPT | Performed by: STUDENT IN AN ORGANIZED HEALTH CARE EDUCATION/TRAINING PROGRAM

## 2023-07-04 PROCEDURE — 96365 THER/PROPH/DIAG IV INF INIT: CPT

## 2023-07-04 PROCEDURE — 93010 ELECTROCARDIOGRAM REPORT: CPT

## 2023-07-04 PROCEDURE — 81001 URINALYSIS AUTO W/SCOPE: CPT | Performed by: STUDENT IN AN ORGANIZED HEALTH CARE EDUCATION/TRAINING PROGRAM

## 2023-07-04 PROCEDURE — 84295 ASSAY OF SERUM SODIUM: CPT | Performed by: STUDENT IN AN ORGANIZED HEALTH CARE EDUCATION/TRAINING PROGRAM

## 2023-07-04 PROCEDURE — 99285 EMERGENCY DEPT VISIT HI MDM: CPT

## 2023-07-04 PROCEDURE — 83935 ASSAY OF URINE OSMOLALITY: CPT | Performed by: STUDENT IN AN ORGANIZED HEALTH CARE EDUCATION/TRAINING PROGRAM

## 2023-07-04 PROCEDURE — 71260 CT THORAX DX C+: CPT | Performed by: STUDENT IN AN ORGANIZED HEALTH CARE EDUCATION/TRAINING PROGRAM

## 2023-07-04 PROCEDURE — 93005 ELECTROCARDIOGRAM TRACING: CPT

## 2023-07-04 PROCEDURE — 82962 GLUCOSE BLOOD TEST: CPT

## 2023-07-04 PROCEDURE — 71045 X-RAY EXAM CHEST 1 VIEW: CPT | Performed by: STUDENT IN AN ORGANIZED HEALTH CARE EDUCATION/TRAINING PROGRAM

## 2023-07-04 PROCEDURE — 85025 COMPLETE CBC W/AUTO DIFF WBC: CPT | Performed by: STUDENT IN AN ORGANIZED HEALTH CARE EDUCATION/TRAINING PROGRAM

## 2023-07-04 PROCEDURE — 80053 COMPREHEN METABOLIC PANEL: CPT | Performed by: STUDENT IN AN ORGANIZED HEALTH CARE EDUCATION/TRAINING PROGRAM

## 2023-07-04 RX ORDER — DEXTROSE MONOHYDRATE 50 MG/ML
INJECTION, SOLUTION INTRAVENOUS CONTINUOUS
Status: DISCONTINUED | OUTPATIENT
Start: 2023-07-04 | End: 2023-07-05

## 2023-07-04 RX ORDER — ALBUTEROL SULFATE 90 UG/1
2 AEROSOL, METERED RESPIRATORY (INHALATION) EVERY 6 HOURS PRN
Status: DISCONTINUED | OUTPATIENT
Start: 2023-07-04 | End: 2023-07-07

## 2023-07-04 RX ORDER — METOPROLOL SUCCINATE 100 MG/1
100 TABLET, EXTENDED RELEASE ORAL 2 TIMES DAILY
Status: DISCONTINUED | OUTPATIENT
Start: 2023-07-04 | End: 2023-07-07

## 2023-07-04 RX ORDER — GABAPENTIN 300 MG/1
300 CAPSULE ORAL 3 TIMES DAILY
Status: DISCONTINUED | OUTPATIENT
Start: 2023-07-04 | End: 2023-07-07

## 2023-07-04 RX ORDER — ONDANSETRON 2 MG/ML
INJECTION INTRAMUSCULAR; INTRAVENOUS
Status: COMPLETED
Start: 2023-07-04 | End: 2023-07-04

## 2023-07-04 RX ORDER — CYCLOBENZAPRINE HCL 5 MG
5 TABLET ORAL 3 TIMES DAILY PRN
Status: DISCONTINUED | OUTPATIENT
Start: 2023-07-04 | End: 2023-07-07

## 2023-07-04 RX ORDER — FLUTICASONE PROPIONATE 50 MCG
2 SPRAY, SUSPENSION (ML) NASAL DAILY
Status: DISCONTINUED | OUTPATIENT
Start: 2023-07-05 | End: 2023-07-07

## 2023-07-04 RX ORDER — DEXTROSE MONOHYDRATE 25 G/50ML
50 INJECTION, SOLUTION INTRAVENOUS
Status: DISCONTINUED | OUTPATIENT
Start: 2023-07-04 | End: 2023-07-07

## 2023-07-04 RX ORDER — ATORVASTATIN CALCIUM 10 MG/1
10 TABLET, FILM COATED ORAL NIGHTLY
Status: DISCONTINUED | OUTPATIENT
Start: 2023-07-04 | End: 2023-07-07

## 2023-07-04 RX ORDER — FLUTICASONE FUROATE AND VILANTEROL 200; 25 UG/1; UG/1
1 POWDER RESPIRATORY (INHALATION) DAILY
Status: DISCONTINUED | OUTPATIENT
Start: 2023-07-04 | End: 2023-07-07

## 2023-07-04 RX ORDER — ASPIRIN 81 MG/1
TABLET, CHEWABLE ORAL
Status: COMPLETED
Start: 2023-07-04 | End: 2023-07-04

## 2023-07-04 RX ORDER — CLONIDINE 0.3 MG/24H
1 PATCH, EXTENDED RELEASE TRANSDERMAL WEEKLY
Status: DISCONTINUED | OUTPATIENT
Start: 2023-07-04 | End: 2023-07-05

## 2023-07-04 RX ORDER — LEVOTHYROXINE SODIUM 0.07 MG/1
75 TABLET ORAL
Status: DISCONTINUED | OUTPATIENT
Start: 2023-07-05 | End: 2023-07-07

## 2023-07-04 RX ORDER — NICOTINE POLACRILEX 4 MG
15 LOZENGE BUCCAL
Status: DISCONTINUED | OUTPATIENT
Start: 2023-07-04 | End: 2023-07-07

## 2023-07-04 RX ORDER — ALPRAZOLAM 0.5 MG/1
1 TABLET ORAL NIGHTLY PRN
Status: DISCONTINUED | OUTPATIENT
Start: 2023-07-04 | End: 2023-07-07

## 2023-07-04 RX ORDER — ENOXAPARIN SODIUM 100 MG/ML
1 INJECTION SUBCUTANEOUS EVERY 12 HOURS SCHEDULED
Status: DISCONTINUED | OUTPATIENT
Start: 2023-07-04 | End: 2023-07-05

## 2023-07-04 RX ORDER — FLUTICASONE FUROATE AND VILANTEROL 200; 25 UG/1; UG/1
1 POWDER RESPIRATORY (INHALATION) DAILY
Status: DISCONTINUED | OUTPATIENT
Start: 2023-07-05 | End: 2023-07-04

## 2023-07-04 RX ORDER — CLONIDINE HYDROCHLORIDE 0.1 MG/1
0.1 TABLET ORAL 2 TIMES DAILY
Status: DISCONTINUED | OUTPATIENT
Start: 2023-07-04 | End: 2023-07-04

## 2023-07-04 RX ORDER — PANTOPRAZOLE SODIUM 40 MG/1
40 TABLET, DELAYED RELEASE ORAL
Status: DISCONTINUED | OUTPATIENT
Start: 2023-07-05 | End: 2023-07-07

## 2023-07-04 RX ORDER — DILTIAZEM HYDROCHLORIDE 5 MG/ML
20 INJECTION INTRAVENOUS ONCE
Status: COMPLETED | OUTPATIENT
Start: 2023-07-04 | End: 2023-07-04

## 2023-07-04 RX ORDER — LEVOTHYROXINE SODIUM 0.07 MG/1
75 TABLET ORAL
COMMUNITY

## 2023-07-04 RX ORDER — NICOTINE POLACRILEX 4 MG
30 LOZENGE BUCCAL
Status: DISCONTINUED | OUTPATIENT
Start: 2023-07-04 | End: 2023-07-07

## 2023-07-04 RX ORDER — CLONIDINE 0.3 MG/24H
1 PATCH, EXTENDED RELEASE TRANSDERMAL WEEKLY
COMMUNITY
End: 2023-07-07

## 2023-07-04 RX ORDER — MONTELUKAST SODIUM 10 MG/1
10 TABLET ORAL NIGHTLY
Status: DISCONTINUED | OUTPATIENT
Start: 2023-07-04 | End: 2023-07-07

## 2023-07-04 RX ORDER — ACETAMINOPHEN 500 MG
500 TABLET ORAL EVERY 4 HOURS PRN
Status: DISCONTINUED | OUTPATIENT
Start: 2023-07-04 | End: 2023-07-07

## 2023-07-04 NOTE — ED QUICK NOTES
Orders for admission, patient is aware of plan and ready to go upstairs. Any questions, please call ED RN  at extension [phone].      Patient Covid vaccination status: Fully vaccinated     COVID Test Ordered in ED: None    COVID Suspicion at Admission: N/A    Running Infusions:    dilTIAZem 10 mg/hr (07/04/23 1407)        Mental Status/LOC at time of transport: a/ox4, purwick in place    Other pertinent information:   CIWA score: N/A   NIH score:  N/A

## 2023-07-04 NOTE — PLAN OF CARE
NURSING ADMISSION NOTE    Assumed pt care at . Admission navigator completed, cardizem gtt infusing at 10mg/hr on arrival to floor, oriented to room, belongings at bedside. Plan of care updated with patient.

## 2023-07-04 NOTE — ED INITIAL ASSESSMENT (HPI)
Pt to ED via EMS w/ c/o chest pressure, n/v that started 2 days ago. Afib rvr noted PTA.  4mg zofran, 325 asprin, nitroglycerin SL given PTA. +SOB
negative...

## 2023-07-05 LAB
ALBUMIN SERPL-MCNC: 3.2 G/DL (ref 3.4–5)
ANION GAP SERPL CALC-SCNC: 6 MMOL/L (ref 0–18)
ANION GAP SERPL CALC-SCNC: 7 MMOL/L (ref 0–18)
ATRIAL RATE: 312 BPM
BUN BLD-MCNC: 12 MG/DL (ref 7–18)
BUN BLD-MCNC: 14 MG/DL (ref 7–18)
CALCIUM BLD-MCNC: 8.5 MG/DL (ref 8.5–10.1)
CALCIUM BLD-MCNC: 8.7 MG/DL (ref 8.5–10.1)
CHLORIDE SERPL-SCNC: 92 MMOL/L (ref 98–112)
CHLORIDE SERPL-SCNC: 95 MMOL/L (ref 98–112)
CO2 SERPL-SCNC: 23 MMOL/L (ref 21–32)
CO2 SERPL-SCNC: 24 MMOL/L (ref 21–32)
CREAT BLD-MCNC: 0.84 MG/DL
CREAT BLD-MCNC: 0.96 MG/DL
ERYTHROCYTE [DISTWIDTH] IN BLOOD BY AUTOMATED COUNT: 13.6 %
GFR SERPLBLD BASED ON 1.73 SQ M-ARVRAT: 60 ML/MIN/1.73M2 (ref 60–?)
GFR SERPLBLD BASED ON 1.73 SQ M-ARVRAT: 71 ML/MIN/1.73M2 (ref 60–?)
GLUCOSE BLD-MCNC: 122 MG/DL (ref 70–99)
GLUCOSE BLD-MCNC: 122 MG/DL (ref 70–99)
GLUCOSE BLD-MCNC: 139 MG/DL (ref 70–99)
GLUCOSE BLD-MCNC: 142 MG/DL (ref 70–99)
GLUCOSE BLD-MCNC: 157 MG/DL (ref 70–99)
GLUCOSE BLD-MCNC: 164 MG/DL (ref 70–99)
HCT VFR BLD AUTO: 38.7 %
HGB BLD-MCNC: 12.9 G/DL
INR BLD: 1.04 (ref 0.85–1.16)
MAGNESIUM SERPL-MCNC: 2.2 MG/DL (ref 1.6–2.6)
MCH RBC QN AUTO: 26 PG (ref 26–34)
MCHC RBC AUTO-ENTMCNC: 33.3 G/DL (ref 31–37)
MCV RBC AUTO: 78 FL
OSMOLALITY SERPL CALC.SUM OF ELEC: 255 MOSM/KG (ref 275–295)
OSMOLALITY SERPL CALC.SUM OF ELEC: 263 MOSM/KG (ref 275–295)
PHOSPHATE SERPL-MCNC: 3.1 MG/DL (ref 2.5–4.9)
PLATELET # BLD AUTO: 284 10(3)UL (ref 150–450)
POTASSIUM SERPL-SCNC: 3.6 MMOL/L (ref 3.5–5.1)
POTASSIUM SERPL-SCNC: 3.8 MMOL/L (ref 3.5–5.1)
PROTHROMBIN TIME: 13.6 SECONDS (ref 11.6–14.8)
Q-T INTERVAL: 310 MS
QRS DURATION: 76 MS
QTC CALCULATION (BEZET): 474 MS
R AXIS: 44 DEGREES
RBC # BLD AUTO: 4.96 X10(6)UL
SODIUM SERPL-SCNC: 122 MMOL/L (ref 136–145)
SODIUM SERPL-SCNC: 125 MMOL/L (ref 136–145)
SODIUM SERPL-SCNC: 126 MMOL/L (ref 136–145)
SODIUM SERPL-SCNC: 129 MMOL/L (ref 136–145)
T AXIS: -20 DEGREES
VENTRICULAR RATE: 141 BPM
WBC # BLD AUTO: 9.2 X10(3) UL (ref 4–11)

## 2023-07-05 PROCEDURE — 82962 GLUCOSE BLOOD TEST: CPT

## 2023-07-05 PROCEDURE — 94640 AIRWAY INHALATION TREATMENT: CPT

## 2023-07-05 PROCEDURE — 97116 GAIT TRAINING THERAPY: CPT

## 2023-07-05 PROCEDURE — 84295 ASSAY OF SERUM SODIUM: CPT | Performed by: STUDENT IN AN ORGANIZED HEALTH CARE EDUCATION/TRAINING PROGRAM

## 2023-07-05 PROCEDURE — 85027 COMPLETE CBC AUTOMATED: CPT | Performed by: HOSPITALIST

## 2023-07-05 PROCEDURE — 83735 ASSAY OF MAGNESIUM: CPT | Performed by: HOSPITALIST

## 2023-07-05 PROCEDURE — 97165 OT EVAL LOW COMPLEX 30 MIN: CPT

## 2023-07-05 PROCEDURE — 97535 SELF CARE MNGMENT TRAINING: CPT

## 2023-07-05 PROCEDURE — 80048 BASIC METABOLIC PNL TOTAL CA: CPT | Performed by: HOSPITALIST

## 2023-07-05 PROCEDURE — 85610 PROTHROMBIN TIME: CPT | Performed by: HOSPITALIST

## 2023-07-05 PROCEDURE — 97161 PT EVAL LOW COMPLEX 20 MIN: CPT

## 2023-07-05 PROCEDURE — 80069 RENAL FUNCTION PANEL: CPT | Performed by: HOSPITALIST

## 2023-07-05 RX ORDER — DEXTROSE MONOHYDRATE 50 MG/ML
INJECTION, SOLUTION INTRAVENOUS CONTINUOUS
Status: DISCONTINUED | OUTPATIENT
Start: 2023-07-05 | End: 2023-07-05

## 2023-07-05 RX ORDER — CLONIDINE 0.3 MG/24H
1 PATCH, EXTENDED RELEASE TRANSDERMAL WEEKLY
Status: DISCONTINUED | OUTPATIENT
Start: 2023-07-05 | End: 2023-07-06

## 2023-07-05 RX ORDER — DILTIAZEM HYDROCHLORIDE 60 MG/1
60 TABLET, FILM COATED ORAL EVERY 6 HOURS SCHEDULED
Status: DISCONTINUED | OUTPATIENT
Start: 2023-07-05 | End: 2023-07-05

## 2023-07-05 NOTE — PLAN OF CARE
Assumed care of pt at 1230. No c/o pain at this time. Alert and oriented x 4. Afib on tele and denies cardiac symptoms. No edema noted. Lung sounds clear. Abdomen soft and round. Skin is C/D/I. Tolerating medications and care needs have been met at this time. POC: Na level q 6, PO cardizem, price eliquis, Urine osmo and sodium. Problem: Diabetes/Glucose Control  Goal: Glucose maintained within prescribed range  Description: INTERVENTIONS:  - Monitor Blood Glucose as ordered  - Assess for signs and symptoms of hyperglycemia and hypoglycemia  - Administer ordered medications to maintain glucose within target range  - Assess barriers to adequate nutritional intake and initiate nutrition consult as needed  - Instruct patient on self management of diabetes  Outcome: Progressing     Problem: Patient/Family Goals  Goal: Patient/Family Long Term Goal  Description: Patient's Long Term Goal: \"go home\"    Interventions:  - medications as ordered by physician \  - testing as ordered by physician   - See additional Care Plan goals for specific interventions  Outcome: Progressing  Goal: Patient/Family Short Term Goal  Description: Patient's Short Term Goal: \"HR controlled\"     Interventions:   - medications as ordered by physician   - testing as ordered by physician   - see cardiology   - See additional Care Plan goals for specific interventions  Outcome: Progressing     Problem: CARDIOVASCULAR - ADULT  Goal: Maintains optimal cardiac output and hemodynamic stability  Description: INTERVENTIONS:  - Monitor vital signs, rhythm, and trends  - Monitor for bleeding, hypotension and signs of decreased cardiac output  - Evaluate effectiveness of vasoactive medications to optimize hemodynamic stability  - Monitor arterial and/or venous puncture sites for bleeding and/or hematoma  - Assess quality of pulses, skin color and temperature  - Assess for signs of decreased coronary artery perfusion - ex.  Angina  - Evaluate fluid balance, assess for edema, trend weights  Outcome: Progressing  Goal: Absence of cardiac arrhythmias or at baseline  Description: INTERVENTIONS:  - Continuous cardiac monitoring, monitor vital signs, obtain 12 lead EKG if indicated  - Evaluate effectiveness of antiarrhythmic and heart rate control medications as ordered  - Initiate emergency measures for life threatening arrhythmias  - Monitor electrolytes and administer replacement therapy as ordered  Outcome: Progressing     Problem: SAFETY ADULT - FALL  Goal: Free from fall injury  Description: INTERVENTIONS:  - Assess pt frequently for physical needs  - Identify cognitive and physical deficits and behaviors that affect risk of falls.   - [place] fall precautions as indicated by assessment.  - Educate pt/family on patient safety including physical limitations  - Instruct pt to call for assistance with activity based on assessment  - Modify environment to reduce risk of injury  - Provide assistive devices as appropriate  - Consider OT/PT consult to assist with strengthening/mobility  - Encourage toileting schedule  Outcome: Progressing

## 2023-07-05 NOTE — HOME CARE LIAISON
Received referral via [employer] for Home Health services. Spoke w/ patient at the bedside and provided with list of [facility] providers from Daniel Johnson, choice is Jenni Hannah . Agency reserved in [employer] and contact information placed on AVS. Financial interest disclosure provided.  Notified Yunier Almanza

## 2023-07-05 NOTE — PROGRESS NOTES
Patient sitting in chair, denies chest pain, shortness of breath and dizziness. Patient alert and oriented, on RA, up with stand by assist. Cardizem gtt stopped and oral started. Call light with in reach, fall precautions reviewed all questions answered.

## 2023-07-05 NOTE — CM/SW NOTE
Pt recommendations made for Home w/ HH/Home PT. Per PT note:    HOME SITUATION  Type of Home: House   Home Layout: Two level; Able to live on main level; Other (Comment) (pt currently goes upstairs 1x/day to shower and for prayer)  Stairs to Enter : 1  Stairs to Bedroom: 14  Railing: Yes     Lives With: Spouse;Caregiver part-time (per pt, gokul has dementia and has caregiver daily 9am-1pm, 3:30-8pm; pt has caretaker 1 hr x 5 days/week (M-F) for cooking)  Drives: No  Patient Owned Equipment: Rolling walker    [address] referrals sent via [employer]. PT also notified CM that pt had questions re: care giver services. Met w/ pt to discuss options. Pt has care giver services in place currently but she is looking for someone who can drive her places. Pt reports her granddaughter used to drive her but she is away at college now. She does not want to burden her family, She was hoping this could be provided by medicare, since she now has recommendations for home care. Discussed that her medicare would not cover this type of service. Caregiver list provided to pt,  Pt also offered community transportation resources. However, she declined. Stating \"then you have to wait for them to take multiple people places and I can't be away from my  for extended time. \" Pt's  has Parkinson's and is bed bound at this point. Pt reports her family is very supportive and friends/ neighbors offer to  groceries when she needs them. Her son has cameras that he uses to check on pt and her spouse at home. She is also happy to have [address] to help her regain her balance and strength. CM/SW will remain available for DC planning and/or support.      Aileen Ferguson, BSN, [facility]    [id number]

## 2023-07-05 NOTE — PHYSICAL THERAPY NOTE
PHYSICAL THERAPY EVALUATION - INPATIENT     Room Number: 3286/6249-A  Evaluation Date: 7/5/2023  Type of Evaluation: Initial  Physician Order: PT Eval and Treat    Presenting Problem: chest pain, back pain, nausea, palpitations  Co-Morbidities : asthma, HTN, HLD, DM2, hypothyroidism  Reason for Therapy: Mobility Dysfunction and Discharge Planning    History related to current admission: Patient is a 77year old female admitted on 7/4/2023 from home for chest pain, palpitations, back pain, and right shoulder pain. ASSESSMENT   In this PT evaluation, the patient presents with the following impairments: decreased standing balance, decreased functional endurance, decreased strength. These impairments and comorbidities manifest themselves as functional limitations in independent bed mobility, transfers, and gait. The patient is below baseline and would benefit from skilled inpatient PT to address the above deficits to assist patient in returning to prior to level of function. Functional outcome measures completed include AMPA. The AM-PAC '6-Clicks' Inpatient Basic Mobility Short Form was completed and this patient is demonstrating a Approx Degree of Impairment: 41.77%  degree of impairment in mobility. Research supports that patients with this level of impairment may benefit from [address] . DISCHARGE RECOMMENDATIONS  PT Discharge Recommendations: Home with home health PT    PLAN  PT Treatment Plan: Body mechanics; Endurance; Energy conservation;Patient education; Family education;Gait training;Strengthening;Transfer training;Balance training;Stair training  Rehab Potential : Good  Frequency (Obs): 3-5x/week  Number of Visits to Meet Established Goals: 4      CURRENT GOALS    Goal #1 Patient is able to demonstrate supine - sit EOB @ level: modified independent     Goal #2 Patient is able to demonstrate transfers Sit to/from Stand at assistance level: modified independent     Goal #3 Patient is able to ambulate 250 feet with assist device: walker - rolling at assistance level: supervision     Goal #4 Pt is able to ascend/descend 14 stairs with railing and supervision   Goal #5    Goal #6    Goal Comments: Goals established on 2023    HOME SITUATION  Type of Home: House   Home Layout: Two level; Able to live on main level; Other (Comment) (pt currently goes upstairs 1x/day to shower and for prayer)  Stairs to Enter : 1     Stairs to Bedroom: 14  Railing: Yes    Lives With: Spouse;Caregiver part-time (per pt, gokul has dementia and has caregiver daily 9am-1pm, 3:30-8pm; pt has caretaker 1 hr x 5 days/week (M-F) for cooking)  Drives: No  Patient Owned Equipment: Rolling walker       Prior Level of [place]: Pt ambulates with RW at home. She states she goes upstairs 1x/day for prayer in her prayer room and to take a shower. She states she could shower on main level if needed. However, pt would like to be able to go upstairs if possible. She does sleep on main level. SUBJECTIVE  Pt states she would like more help at home - OT informed SW.      OBJECTIVE  Precautions: Bed/chair alarm  Fall Risk: High fall risk    WEIGHT BEARING RESTRICTION                   PAIN ASSESSMENT  Ratin  Location: left side and center of low back (chronic per pt)  Management Techniques: Body mechanics; Activity promotion;Repositioning    COGNITION  Overall Cognitive Status:  WFL - within functional limits    RANGE OF MOTION AND STRENGTH ASSESSMENT  Upper extremity ROM and strength -- see OT note    Lower extremity ROM is within functional limits     Lower extremity strength is within functional limits       BALANCE  Static Sitting: Fair +  Dynamic Sitting: Fair  Static Standing: Fair -  Dynamic Standing: Fair -    ADDITIONAL TESTS                                    ACTIVITY TOLERANCE                         O2 WALK  Oxygen Therapy  SPO2% on Room Air at Rest: 96    NEUROLOGICAL FINDINGS                        AM-PAC '6-Clicks' INPATIENT SHORT FORM - BASIC MOBILITY  How much difficulty does the patient currently have. .. Patient Difficulty: Turning over in bed (including adjusting bedclothes, sheets and blankets)?: None   Patient Difficulty: Sitting down on and standing up from a chair with arms (e.g., wheelchair, bedside commode, etc.): A Little   Patient Difficulty: Moving from lying on back to sitting on the side of the bed?: A Little   How much help from another person does the patient currently need. .. Help from Another: Moving to and from a bed to a chair (including a wheelchair)?: A Little   Help from Another: Need to walk in hospital room?: A Little   Help from Another: Climbing 3-5 steps with a railing?: A Little       AM-PAC Score:  Raw Score: 19   Approx Degree of Impairment: 41.77%   Standardized Score (AM-PAC Scale): 45.44   CMS Modifier (G-Code): CK    FUNCTIONAL ABILITY STATUS  Gait Assessment   Functional Mobility/Gait Assessment  Gait Assistance: Supervision  Distance (ft): 200  Assistive Device: Rolling walker    Skilled Therapy Provided     Bed Mobility:  Supine to sit: supervision with HOB elevated   Sit to supine: NT     Transfer Mobility:  Sit to stand: CGA   Stand to sit: SBA  Gait = Pt ambulated 200' with RW and supervision; cues to keep RW closer to body, for more upright posture, and to relax shoulders (avoid shoulder shrugging)    Therapist's Comments: Pt lying in bed and agreeable to PT. Educated pt on ways to avoid aggravating back, janice posture, avoiding bending/twisting. Pt verbalized understanding. Discussed dc recommendation with pt. Exercise/Education Provided: Body mechanics  Functional activity tolerated  Gait training  Posture    Patient End of Session: Up in chair;Needs met;Call light within reach;RN aware of session/findings; All patient questions and concerns addressed; Alarm set; Discussed recommendations with /      Patient Evaluation Complexity Level:  History Moderate - 1 or 2 personal factors and/or co-morbidities   Examination of body systems Low - addressing 1-2 elements   Clinical Presentation Low - Stable   Clinical Decision Making Low - Stable       PT Session Time: 32 minutes  Gait Trainin minutes

## 2023-07-05 NOTE — DISCHARGE INSTRUCTIONS
Sometimes managing your health at home requires assistance. The [place] team has recognized your preference to use [facility]. They can be reached by phone at [phone]. The fax number for your reference is [phone]. A representative from the home health agency will contact you or your family to schedule your first visit. Start taking Cardizem for heart rate control  Stop clonidine   Start taking Eliquis - blood thinner for atrial fibrillation and clot prevention - if you notice any bleeding let PCP know right away  Do not take any NSAIS (ibuprofen / aleve/ motrin etc)    Fluid restriction to 2L for your low sodium levels  Follow up with PCP as scheduled. Repeat BMP (labs to check sodium level) next office visit with PCP      Transportation Resources:  -Danyell Chawla - [phone] www. [facility]. [facility] Yulisa Martínez [phone] or [phone] [email or web address]  -[employer] [phone] [facility]. [email or web address]/First_[email or web address]_Request_%20Instructions. pdf    [facility] - Caregiving Assistance Resource  [email or web address]

## 2023-07-05 NOTE — PLAN OF CARE
Assumed care of pt at 1930  A/Ox4, up w/ standby, steady gait. Room air. Pt denies shortness of breath. Afib on tele w/ uncontrolled rates. Pt denies chest pain. Cardizem gtt running per orders. Pt is continent of bladder and bowel. Skin warm, dry and intact. Fall precautions in place. Call light in reach. Pt updated on plan of care. 200  Notified Nephrology of pt's recheck Na level. Added D5W as ordered. 2340  Adjusted Cardizem gtt to maintain HR.   0140  Adjusted Cardizem gtt to maintain HR.      Problem: Diabetes/Glucose Control  Goal: Glucose maintained within prescribed range  Description: INTERVENTIONS:  - Monitor Blood Glucose as ordered  - Assess for signs and symptoms of hyperglycemia and hypoglycemia  - Administer ordered medications to maintain glucose within target range  - Assess barriers to adequate nutritional intake and initiate nutrition consult as needed  - Instruct patient on self management of diabetes  Outcome: Progressing     Problem: Patient/Family Goals  Goal: Patient/Family Long Term Goal  Description: Patient's Long Term Goal: \"go home\"    Interventions:  - medications as ordered by physician \  - testing as ordered by physician   - See additional Care Plan goals for specific interventions  Outcome: Progressing  Goal: Patient/Family Short Term Goal  Description: Patient's Short Term Goal: \"HR controlled\"     Interventions:   - medications as ordered by physician   - testing as ordered by physician   - see cardiology   - See additional Care Plan goals for specific interventions  Outcome: Progressing     Problem: CARDIOVASCULAR - ADULT  Goal: Maintains optimal cardiac output and hemodynamic stability  Description: INTERVENTIONS:  - Monitor vital signs, rhythm, and trends  - Monitor for bleeding, hypotension and signs of decreased cardiac output  - Evaluate effectiveness of vasoactive medications to optimize hemodynamic stability  - Monitor arterial and/or venous puncture sites for bleeding and/or hematoma  - Assess quality of pulses, skin color and temperature  - Assess for signs of decreased coronary artery perfusion - ex. Angina  - Evaluate fluid balance, assess for edema, trend weights  Outcome: Progressing  Goal: Absence of cardiac arrhythmias or at baseline  Description: INTERVENTIONS:  - Continuous cardiac monitoring, monitor vital signs, obtain 12 lead EKG if indicated  - Evaluate effectiveness of antiarrhythmic and heart rate control medications as ordered  - Initiate emergency measures for life threatening arrhythmias  - Monitor electrolytes and administer replacement therapy as ordered  Outcome: Progressing     Problem: SAFETY ADULT - FALL  Goal: Free from fall injury  Description: INTERVENTIONS:  - Assess pt frequently for physical needs  - Identify cognitive and physical deficits and behaviors that affect risk of falls.   - [place] fall precautions as indicated by assessment.  - Educate pt/family on patient safety including physical limitations  - Instruct pt to call for assistance with activity based on assessment  - Modify environment to reduce risk of injury  - Provide assistive devices as appropriate  - Consider OT/PT consult to assist with strengthening/mobility  - Encourage toileting schedule  Outcome: Progressing

## 2023-07-06 LAB
ANION GAP SERPL CALC-SCNC: 4 MMOL/L (ref 0–18)
BUN BLD-MCNC: 29 MG/DL (ref 7–18)
CALCIUM BLD-MCNC: 9.3 MG/DL (ref 8.5–10.1)
CHLORIDE SERPL-SCNC: 97 MMOL/L (ref 98–112)
CO2 SERPL-SCNC: 27 MMOL/L (ref 21–32)
CREAT BLD-MCNC: 0.89 MG/DL
ERYTHROCYTE [DISTWIDTH] IN BLOOD BY AUTOMATED COUNT: 13.8 %
GFR SERPLBLD BASED ON 1.73 SQ M-ARVRAT: 66 ML/MIN/1.73M2 (ref 60–?)
GLUCOSE BLD-MCNC: 130 MG/DL (ref 70–99)
GLUCOSE BLD-MCNC: 152 MG/DL (ref 70–99)
GLUCOSE BLD-MCNC: 155 MG/DL (ref 70–99)
GLUCOSE BLD-MCNC: 183 MG/DL (ref 70–99)
GLUCOSE BLD-MCNC: 193 MG/DL (ref 70–99)
HCT VFR BLD AUTO: 36.4 %
HGB BLD-MCNC: 11.9 G/DL
INR BLD: 1.13 (ref 0.85–1.16)
MAGNESIUM SERPL-MCNC: 2 MG/DL (ref 1.6–2.6)
MCH RBC QN AUTO: 26 PG (ref 26–34)
MCHC RBC AUTO-ENTMCNC: 32.7 G/DL (ref 31–37)
MCV RBC AUTO: 79.6 FL
OSMOLALITY SERPL CALC.SUM OF ELEC: 275 MOSM/KG (ref 275–295)
OSMOLALITY UR: 157 MOSM/KG (ref 300–1300)
PLATELET # BLD AUTO: 224 10(3)UL (ref 150–450)
POTASSIUM SERPL-SCNC: 4.6 MMOL/L (ref 3.5–5.1)
PROTHROMBIN TIME: 14.5 SECONDS (ref 11.6–14.8)
RBC # BLD AUTO: 4.57 X10(6)UL
SODIUM SERPL-SCNC: 125 MMOL/L (ref 136–145)
SODIUM SERPL-SCNC: 126 MMOL/L (ref 136–145)
SODIUM SERPL-SCNC: 127 MMOL/L (ref 136–145)
SODIUM SERPL-SCNC: 128 MMOL/L (ref 136–145)
SODIUM SERPL-SCNC: 128 MMOL/L (ref 136–145)
SODIUM SERPL-SCNC: 25 MMOL/L
WBC # BLD AUTO: 8 X10(3) UL (ref 4–11)

## 2023-07-06 PROCEDURE — 84300 ASSAY OF URINE SODIUM: CPT | Performed by: STUDENT IN AN ORGANIZED HEALTH CARE EDUCATION/TRAINING PROGRAM

## 2023-07-06 PROCEDURE — 83935 ASSAY OF URINE OSMOLALITY: CPT | Performed by: STUDENT IN AN ORGANIZED HEALTH CARE EDUCATION/TRAINING PROGRAM

## 2023-07-06 PROCEDURE — 85610 PROTHROMBIN TIME: CPT | Performed by: HOSPITALIST

## 2023-07-06 PROCEDURE — 82962 GLUCOSE BLOOD TEST: CPT

## 2023-07-06 PROCEDURE — 84295 ASSAY OF SERUM SODIUM: CPT | Performed by: STUDENT IN AN ORGANIZED HEALTH CARE EDUCATION/TRAINING PROGRAM

## 2023-07-06 PROCEDURE — 83735 ASSAY OF MAGNESIUM: CPT | Performed by: HOSPITALIST

## 2023-07-06 PROCEDURE — 85027 COMPLETE CBC AUTOMATED: CPT | Performed by: HOSPITALIST

## 2023-07-06 PROCEDURE — 97530 THERAPEUTIC ACTIVITIES: CPT

## 2023-07-06 PROCEDURE — 80048 BASIC METABOLIC PNL TOTAL CA: CPT | Performed by: HOSPITALIST

## 2023-07-06 PROCEDURE — 97535 SELF CARE MNGMENT TRAINING: CPT

## 2023-07-06 RX ORDER — DILTIAZEM HYDROCHLORIDE 240 MG/1
240 CAPSULE, COATED, EXTENDED RELEASE ORAL DAILY
Status: DISCONTINUED | OUTPATIENT
Start: 2023-07-06 | End: 2023-07-07

## 2023-07-06 NOTE — PLAN OF CARE
Assumed care of pt at 1930  A/Ox4, up w/ standby, steady gait. Room air, Afib on tele, uncontrolled rates. Rates as low as high 30s, midnight cardizem dose held. Pt transitioned from cardizem gtt to PO today. Pt denies shortness of breath or chest pain. Pt continent of bladder and bowel. Skin warm, dry. Bed locked and in lowest position. Call light in reach. Pt updated on plan of care.      Problem: Diabetes/Glucose Control  Goal: Glucose maintained within prescribed range  Description: INTERVENTIONS:  - Monitor Blood Glucose as ordered  - Assess for signs and symptoms of hyperglycemia and hypoglycemia  - Administer ordered medications to maintain glucose within target range  - Assess barriers to adequate nutritional intake and initiate nutrition consult as needed  - Instruct patient on self management of diabetes  Outcome: Progressing     Problem: Patient/Family Goals  Goal: Patient/Family Long Term Goal  Description: Patient's Long Term Goal: \"go home\"    Interventions:  - medications as ordered by physician \  - testing as ordered by physician   - See additional Care Plan goals for specific interventions  Outcome: Progressing  Goal: Patient/Family Short Term Goal  Description: Patient's Short Term Goal: \"HR controlled\"     Interventions:   - medications as ordered by physician   - testing as ordered by physician   - see cardiology   - See additional Care Plan goals for specific interventions  Outcome: Progressing     Problem: CARDIOVASCULAR - ADULT  Goal: Maintains optimal cardiac output and hemodynamic stability  Description: INTERVENTIONS:  - Monitor vital signs, rhythm, and trends  - Monitor for bleeding, hypotension and signs of decreased cardiac output  - Evaluate effectiveness of vasoactive medications to optimize hemodynamic stability  - Monitor arterial and/or venous puncture sites for bleeding and/or hematoma  - Assess quality of pulses, skin color and temperature  - Assess for signs of decreased coronary artery perfusion - ex. Angina  - Evaluate fluid balance, assess for edema, trend weights  Outcome: Progressing  Goal: Absence of cardiac arrhythmias or at baseline  Description: INTERVENTIONS:  - Continuous cardiac monitoring, monitor vital signs, obtain 12 lead EKG if indicated  - Evaluate effectiveness of antiarrhythmic and heart rate control medications as ordered  - Initiate emergency measures for life threatening arrhythmias  - Monitor electrolytes and administer replacement therapy as ordered  Outcome: Progressing     Problem: SAFETY ADULT - FALL  Goal: Free from fall injury  Description: INTERVENTIONS:  - Assess pt frequently for physical needs  - Identify cognitive and physical deficits and behaviors that affect risk of falls.   - [place] fall precautions as indicated by assessment.  - Educate pt/family on patient safety including physical limitations  - Instruct pt to call for assistance with activity based on assessment  - Modify environment to reduce risk of injury  - Provide assistive devices as appropriate  - Consider OT/PT consult to assist with strengthening/mobility  - Encourage toileting schedule  Outcome: Progressing

## 2023-07-06 NOTE — PLAN OF CARE
Patient Jerzy Barboza. On room air. NSR on tele. No c/o pain. Up with standby assist and a walker. Seen by OT today. IVF bolus given per nephrology. Continent of bowel and bladder. Call light within reach. Will continue to monitor. Problem: Diabetes/Glucose Control  Goal: Glucose maintained within prescribed range  Description: INTERVENTIONS:  - Monitor Blood Glucose as ordered  - Assess for signs and symptoms of hyperglycemia and hypoglycemia  - Administer ordered medications to maintain glucose within target range  - Assess barriers to adequate nutritional intake and initiate nutrition consult as needed  - Instruct patient on self management of diabetes  Outcome: Progressing     Problem: Patient/Family Goals  Goal: Patient/Family Long Term Goal  Description: Patient's Long Term Goal: \"go home\"    Interventions:  - medications as ordered by physician \  - testing as ordered by physician   - See additional Care Plan goals for specific interventions  Outcome: Progressing  Goal: Patient/Family Short Term Goal  Description: Patient's Short Term Goal: \"HR controlled\"     Interventions:   - medications as ordered by physician   - testing as ordered by physician   - see cardiology   - See additional Care Plan goals for specific interventions  Outcome: Progressing     Problem: CARDIOVASCULAR - ADULT  Goal: Maintains optimal cardiac output and hemodynamic stability  Description: INTERVENTIONS:  - Monitor vital signs, rhythm, and trends  - Monitor for bleeding, hypotension and signs of decreased cardiac output  - Evaluate effectiveness of vasoactive medications to optimize hemodynamic stability  - Monitor arterial and/or venous puncture sites for bleeding and/or hematoma  - Assess quality of pulses, skin color and temperature  - Assess for signs of decreased coronary artery perfusion - ex.  Angina  - Evaluate fluid balance, assess for edema, trend weights  Outcome: Progressing  Goal: Absence of cardiac arrhythmias or at baseline  Description: INTERVENTIONS:  - Continuous cardiac monitoring, monitor vital signs, obtain 12 lead EKG if indicated  - Evaluate effectiveness of antiarrhythmic and heart rate control medications as ordered  - Initiate emergency measures for life threatening arrhythmias  - Monitor electrolytes and administer replacement therapy as ordered  Outcome: Progressing     Problem: SAFETY ADULT - FALL  Goal: Free from fall injury  Description: INTERVENTIONS:  - Assess pt frequently for physical needs  - Identify cognitive and physical deficits and behaviors that affect risk of falls.   - [place] fall precautions as indicated by assessment.  - Educate pt/family on patient safety including physical limitations  - Instruct pt to call for assistance with activity based on assessment  - Modify environment to reduce risk of injury  - Provide assistive devices as appropriate  - Consider OT/PT consult to assist with strengthening/mobility  - Encourage toileting schedule  Outcome: Progressing

## 2023-07-07 VITALS
BODY MASS INDEX: 24.67 KG/M2 | WEIGHT: 117.5 LBS | TEMPERATURE: 97 F | HEART RATE: 92 BPM | SYSTOLIC BLOOD PRESSURE: 123 MMHG | RESPIRATION RATE: 18 BRPM | DIASTOLIC BLOOD PRESSURE: 65 MMHG | HEIGHT: 58 IN | OXYGEN SATURATION: 90 %

## 2023-07-07 LAB
ANION GAP SERPL CALC-SCNC: 3 MMOL/L (ref 0–18)
BUN BLD-MCNC: 38 MG/DL (ref 7–18)
CALCIUM BLD-MCNC: 8.6 MG/DL (ref 8.5–10.1)
CHLORIDE SERPL-SCNC: 99 MMOL/L (ref 98–112)
CO2 SERPL-SCNC: 28 MMOL/L (ref 21–32)
CREAT BLD-MCNC: 1 MG/DL
ERYTHROCYTE [DISTWIDTH] IN BLOOD BY AUTOMATED COUNT: 14.1 %
GFR SERPLBLD BASED ON 1.73 SQ M-ARVRAT: 57 ML/MIN/1.73M2 (ref 60–?)
GLUCOSE BLD-MCNC: 124 MG/DL (ref 70–99)
GLUCOSE BLD-MCNC: 153 MG/DL (ref 70–99)
GLUCOSE BLD-MCNC: 154 MG/DL (ref 70–99)
HCT VFR BLD AUTO: 33.4 %
HGB BLD-MCNC: 10.9 G/DL
INR BLD: 1.13 (ref 0.85–1.16)
MAGNESIUM SERPL-MCNC: 1.9 MG/DL (ref 1.6–2.6)
MCH RBC QN AUTO: 26.3 PG (ref 26–34)
MCHC RBC AUTO-ENTMCNC: 32.6 G/DL (ref 31–37)
MCV RBC AUTO: 80.7 FL
OSMOLALITY SERPL CALC.SUM OF ELEC: 282 MOSM/KG (ref 275–295)
PLATELET # BLD AUTO: 230 10(3)UL (ref 150–450)
POTASSIUM SERPL-SCNC: 5.1 MMOL/L (ref 3.5–5.1)
PROTHROMBIN TIME: 14.5 SECONDS (ref 11.6–14.8)
RBC # BLD AUTO: 4.14 X10(6)UL
SODIUM SERPL-SCNC: 125 MMOL/L (ref 136–145)
SODIUM SERPL-SCNC: 128 MMOL/L (ref 136–145)
SODIUM SERPL-SCNC: 129 MMOL/L (ref 136–145)
SODIUM SERPL-SCNC: 130 MMOL/L (ref 136–145)
WBC # BLD AUTO: 7.7 X10(3) UL (ref 4–11)

## 2023-07-07 PROCEDURE — 85027 COMPLETE CBC AUTOMATED: CPT | Performed by: HOSPITALIST

## 2023-07-07 PROCEDURE — 85610 PROTHROMBIN TIME: CPT | Performed by: HOSPITALIST

## 2023-07-07 PROCEDURE — 83735 ASSAY OF MAGNESIUM: CPT | Performed by: HOSPITALIST

## 2023-07-07 PROCEDURE — 82962 GLUCOSE BLOOD TEST: CPT

## 2023-07-07 PROCEDURE — 84295 ASSAY OF SERUM SODIUM: CPT | Performed by: STUDENT IN AN ORGANIZED HEALTH CARE EDUCATION/TRAINING PROGRAM

## 2023-07-07 PROCEDURE — 80048 BASIC METABOLIC PNL TOTAL CA: CPT | Performed by: HOSPITALIST

## 2023-07-07 RX ORDER — DILTIAZEM HYDROCHLORIDE 180 MG/1
180 CAPSULE, EXTENDED RELEASE ORAL DAILY
Status: DISCONTINUED | OUTPATIENT
Start: 2023-07-08 | End: 2023-07-07

## 2023-07-07 RX ORDER — ATORVASTATIN CALCIUM 10 MG/1
10 TABLET, FILM COATED ORAL NIGHTLY
Qty: 90 TABLET | Refills: 3 | Status: SHIPPED | OUTPATIENT
Start: 2023-07-07

## 2023-07-07 RX ORDER — ALPRAZOLAM 1 MG/1
1 TABLET ORAL 3 TIMES DAILY PRN
COMMUNITY

## 2023-07-07 RX ORDER — DILTIAZEM HYDROCHLORIDE 180 MG/1
180 CAPSULE, COATED, EXTENDED RELEASE ORAL DAILY
Qty: 90 CAPSULE | Refills: 3 | Status: SHIPPED | OUTPATIENT
Start: 2023-07-08

## 2023-07-07 RX ORDER — KETOTIFEN FUMARATE 0.35 MG/ML
1 SOLUTION/ DROPS OPHTHALMIC 2 TIMES DAILY
Status: DISCONTINUED | OUTPATIENT
Start: 2023-07-07 | End: 2023-07-07

## 2023-07-07 NOTE — CM/SW NOTE
ELLA called pt's Sheri Brooks to determine [employer]. Cost is $50 per month. Pt entitled to a free 30 day coupon card. Will give to RN to give to pt.      ANNAMARIA Drake, [facility]  Discharge [address] [facility].

## 2023-07-07 NOTE — PLAN OF CARE
Alert and oriented x4 on tele monitor hr 60's afib. Denies any pain. Updated w/ poc and verbalized understanding. All needs attended and will continue to monitor. Call light within reach. Problem: Diabetes/Glucose Control  Goal: Glucose maintained within prescribed range  Description: INTERVENTIONS:  - Monitor Blood Glucose as ordered  - Assess for signs and symptoms of hyperglycemia and hypoglycemia  - Administer ordered medications to maintain glucose within target range  - Assess barriers to adequate nutritional intake and initiate nutrition consult as needed  - Instruct patient on self management of diabetes  Outcome: Progressing     Problem: Patient/Family Goals  Goal: Patient/Family Long Term Goal  Description: Patient's Long Term Goal: \"go home\"    Interventions:  - medications as ordered by physician \  - testing as ordered by physician   - See additional Care Plan goals for specific interventions  Outcome: Progressing  Goal: Patient/Family Short Term Goal  Description: Patient's Short Term Goal: \"HR controlled\"     Interventions:   - medications as ordered by physician   - testing as ordered by physician   - see cardiology   - See additional Care Plan goals for specific interventions  Outcome: Progressing     Problem: CARDIOVASCULAR - ADULT  Goal: Maintains optimal cardiac output and hemodynamic stability  Description: INTERVENTIONS:  - Monitor vital signs, rhythm, and trends  - Monitor for bleeding, hypotension and signs of decreased cardiac output  - Evaluate effectiveness of vasoactive medications to optimize hemodynamic stability  - Monitor arterial and/or venous puncture sites for bleeding and/or hematoma  - Assess quality of pulses, skin color and temperature  - Assess for signs of decreased coronary artery perfusion - ex.  Angina  - Evaluate fluid balance, assess for edema, trend weights  Outcome: Progressing  Goal: Absence of cardiac arrhythmias or at baseline  Description: INTERVENTIONS:  - Continuous cardiac monitoring, monitor vital signs, obtain 12 lead EKG if indicated  - Evaluate effectiveness of antiarrhythmic and heart rate control medications as ordered  - Initiate emergency measures for life threatening arrhythmias  - Monitor electrolytes and administer replacement therapy as ordered  Outcome: Progressing     Problem: SAFETY ADULT - FALL  Goal: Free from fall injury  Description: INTERVENTIONS:  - Assess pt frequently for physical needs  - Identify cognitive and physical deficits and behaviors that affect risk of falls.   - [place] fall precautions as indicated by assessment.  - Educate pt/family on patient safety including physical limitations  - Instruct pt to call for assistance with activity based on assessment  - Modify environment to reduce risk of injury  - Provide assistive devices as appropriate  - Consider OT/PT consult to assist with strengthening/mobility  - Encourage toileting schedule  Outcome: Progressing

## 2023-07-07 NOTE — DISCHARGE PLANNING
Explained discharge instructions including medications and follow-up appointments to pt, verbalized understanding. IV removed. Tele monitor discontinued. Will be transported via wheelchair.

## 2023-07-07 NOTE — PLAN OF CARE
Rec'd pt at 0730. A&O x 4. Tele shows a fib. O2 sats adequate on RA. Pt continent, up w/ SBA and walker. No C/O pain or SOB. Skin dry and intact. Bed locked and in low position, call light and personal items within reach. Will continue to monitor. POC - Discharging home this afternoon w/ HHPT. Problem: Diabetes/Glucose Control  Goal: Glucose maintained within prescribed range  Description: INTERVENTIONS:  - Monitor Blood Glucose as ordered  - Assess for signs and symptoms of hyperglycemia and hypoglycemia  - Administer ordered medications to maintain glucose within target range  - Assess barriers to adequate nutritional intake and initiate nutrition consult as needed  - Instruct patient on self management of diabetes  Outcome: Progressing     Problem: Patient/Family Goals  Goal: Patient/Family Long Term Goal  Description: Patient's Long Term Goal: \"go home\"    Interventions:  - medications as ordered by physician \  - testing as ordered by physician   - See additional Care Plan goals for specific interventions  Outcome: Progressing  Goal: Patient/Family Short Term Goal  Description: Patient's Short Term Goal: \"HR controlled\"     Interventions:   - medications as ordered by physician   - testing as ordered by physician   - see cardiology   - See additional Care Plan goals for specific interventions  Outcome: Progressing     Problem: CARDIOVASCULAR - ADULT  Goal: Maintains optimal cardiac output and hemodynamic stability  Description: INTERVENTIONS:  - Monitor vital signs, rhythm, and trends  - Monitor for bleeding, hypotension and signs of decreased cardiac output  - Evaluate effectiveness of vasoactive medications to optimize hemodynamic stability  - Monitor arterial and/or venous puncture sites for bleeding and/or hematoma  - Assess quality of pulses, skin color and temperature  - Assess for signs of decreased coronary artery perfusion - ex.  Angina  - Evaluate fluid balance, assess for edema, trend weights  Outcome: Progressing  Goal: Absence of cardiac arrhythmias or at baseline  Description: INTERVENTIONS:  - Continuous cardiac monitoring, monitor vital signs, obtain 12 lead EKG if indicated  - Evaluate effectiveness of antiarrhythmic and heart rate control medications as ordered  - Initiate emergency measures for life threatening arrhythmias  - Monitor electrolytes and administer replacement therapy as ordered  Outcome: Progressing     Problem: SAFETY ADULT - FALL  Goal: Free from fall injury  Description: INTERVENTIONS:  - Assess pt frequently for physical needs  - Identify cognitive and physical deficits and behaviors that affect risk of falls.   - [place] fall precautions as indicated by assessment.  - Educate pt/family on patient safety including physical limitations  - Instruct pt to call for assistance with activity based on assessment  - Modify environment to reduce risk of injury  - Provide assistive devices as appropriate  - Consider OT/PT consult to assist with strengthening/mobility  - Encourage toileting schedule  Outcome: Progressing

## 2023-07-07 NOTE — CM/SW NOTE
SW met with pt at bedside per RN request. Confirmed w/ pt that she is set up with [facility]. Provided [employer] coupon to pt as well. Pt had questions regarding caregiver services and transportation resources. Transportation resources placed on AVS, as well as information on [employer].      ANNAMARIA Montenegro, [facility]  Discharge [address] [facility].

## 2023-07-07 NOTE — CM/SW NOTE
Pt is going to be prescribed Elliquis.  Will need RX to check cost.     ANNAMARIA Damon, [facility]  Discharge [address] [facility].

## 2024-04-02 ENCOUNTER — APPOINTMENT (OUTPATIENT)
Dept: CV DIAGNOSTICS | Facility: HOSPITAL | Age: 79
End: 2024-04-02
Attending: INTERNAL MEDICINE
Payer: MEDICARE

## 2024-04-02 ENCOUNTER — HOSPITAL ENCOUNTER (INPATIENT)
Facility: HOSPITAL | Age: 79
LOS: 2 days | Discharge: HOME HEALTH | End: 2024-04-04
Attending: INTERNAL MEDICINE | Admitting: INTERNAL MEDICINE
Payer: MEDICARE

## 2024-04-02 ENCOUNTER — TELEPHONE (OUTPATIENT)
Dept: CASE MANAGEMENT | Facility: HOSPITAL | Age: 79
End: 2024-04-02

## 2024-04-02 DIAGNOSIS — E78.2 MIXED HYPERLIPIDEMIA: ICD-10-CM

## 2024-04-02 DIAGNOSIS — R09.89 LABILE HYPERTENSION: ICD-10-CM

## 2024-04-02 DIAGNOSIS — R06.09 DYSPNEA ON EXERTION: ICD-10-CM

## 2024-04-02 PROBLEM — R00.1 BRADYCARDIA: Status: ACTIVE | Noted: 2024-04-02

## 2024-04-02 PROBLEM — E87.5 HYPERKALEMIA: Status: ACTIVE | Noted: 2024-04-02

## 2024-04-02 LAB
ALBUMIN SERPL-MCNC: 3.4 G/DL (ref 3.4–5)
ALBUMIN/GLOB SERPL: 1.1 {RATIO} (ref 1–2)
ALP LIVER SERPL-CCNC: 137 U/L
ALT SERPL-CCNC: 413 U/L
ANION GAP SERPL CALC-SCNC: 10 MMOL/L (ref 0–18)
ANION GAP SERPL CALC-SCNC: 10 MMOL/L (ref 0–18)
ANION GAP SERPL CALC-SCNC: 11 MMOL/L (ref 0–18)
ANION GAP SERPL CALC-SCNC: 9 MMOL/L (ref 0–18)
AST SERPL-CCNC: 316 U/L (ref 15–37)
BILIRUB SERPL-MCNC: 0.4 MG/DL (ref 0.1–2)
BILIRUB UR QL STRIP.AUTO: NEGATIVE
BUN BLD-MCNC: 26 MG/DL (ref 9–23)
BUN BLD-MCNC: 29 MG/DL (ref 9–23)
CALCIUM BLD-MCNC: 9.4 MG/DL (ref 8.5–10.1)
CALCIUM BLD-MCNC: 9.7 MG/DL (ref 8.5–10.1)
CALCIUM BLD-MCNC: 9.9 MG/DL (ref 8.5–10.1)
CALCIUM BLD-MCNC: 9.9 MG/DL (ref 8.5–10.1)
CHLORIDE SERPL-SCNC: 92 MMOL/L (ref 98–112)
CHLORIDE SERPL-SCNC: 92 MMOL/L (ref 98–112)
CHLORIDE SERPL-SCNC: 93 MMOL/L (ref 98–112)
CHLORIDE SERPL-SCNC: 93 MMOL/L (ref 98–112)
CLARITY UR REFRACT.AUTO: CLEAR
CO2 SERPL-SCNC: 24 MMOL/L (ref 21–32)
CO2 SERPL-SCNC: 25 MMOL/L (ref 21–32)
CO2 SERPL-SCNC: 25 MMOL/L (ref 21–32)
CO2 SERPL-SCNC: 26 MMOL/L (ref 21–32)
COLOR UR AUTO: COLORLESS
CREAT BLD-MCNC: 1.04 MG/DL
CREAT BLD-MCNC: 1.04 MG/DL
CREAT BLD-MCNC: 1.12 MG/DL
CREAT BLD-MCNC: 1.2 MG/DL
DIGOXIN SERPL-MCNC: 0.09 NG/ML (ref 0.8–2)
EGFRCR SERPLBLD CKD-EPI 2021: 46 ML/MIN/1.73M2 (ref 60–?)
EGFRCR SERPLBLD CKD-EPI 2021: 50 ML/MIN/1.73M2 (ref 60–?)
EGFRCR SERPLBLD CKD-EPI 2021: 55 ML/MIN/1.73M2 (ref 60–?)
EGFRCR SERPLBLD CKD-EPI 2021: 55 ML/MIN/1.73M2 (ref 60–?)
ERYTHROCYTE [DISTWIDTH] IN BLOOD BY AUTOMATED COUNT: 14.5 %
EST. AVERAGE GLUCOSE BLD GHB EST-MCNC: 171 MG/DL (ref 68–126)
GLOBULIN PLAS-MCNC: 3.1 G/DL (ref 2.8–4.4)
GLUCOSE BLD-MCNC: 164 MG/DL (ref 70–99)
GLUCOSE BLD-MCNC: 178 MG/DL (ref 70–99)
GLUCOSE BLD-MCNC: 178 MG/DL (ref 70–99)
GLUCOSE BLD-MCNC: 193 MG/DL (ref 70–99)
GLUCOSE BLD-MCNC: 216 MG/DL (ref 70–99)
GLUCOSE BLD-MCNC: 220 MG/DL (ref 70–99)
GLUCOSE BLD-MCNC: 228 MG/DL (ref 70–99)
GLUCOSE BLD-MCNC: 244 MG/DL (ref 70–99)
GLUCOSE UR STRIP.AUTO-MCNC: NORMAL MG/DL
HBA1C MFR BLD: 7.6 % (ref ?–5.7)
HCT VFR BLD AUTO: 32.6 %
HGB BLD-MCNC: 10.3 G/DL
KETONES UR STRIP.AUTO-MCNC: NEGATIVE MG/DL
LACTATE SERPL-SCNC: 3.9 MMOL/L (ref 0.4–2)
LACTATE SERPL-SCNC: 4.6 MMOL/L (ref 0.4–2)
LACTATE SERPL-SCNC: 4.9 MMOL/L (ref 0.4–2)
LEUKOCYTE ESTERASE UR QL STRIP.AUTO: 25
MAGNESIUM SERPL-MCNC: 1.7 MG/DL (ref 1.6–2.6)
MCH RBC QN AUTO: 25.8 PG (ref 26–34)
MCHC RBC AUTO-ENTMCNC: 31.6 G/DL (ref 31–37)
MCV RBC AUTO: 81.5 FL
MRSA DNA SPEC QL NAA+PROBE: NEGATIVE
NITRITE UR QL STRIP.AUTO: NEGATIVE
OSMOLALITY SERPL CALC.SUM OF ELEC: 276 MOSM/KG (ref 275–295)
OSMOLALITY SERPL CALC.SUM OF ELEC: 277 MOSM/KG (ref 275–295)
OSMOLALITY UR: 320 MOSM/KG (ref 300–1300)
PH UR STRIP.AUTO: 5.5 [PH] (ref 5–8)
PHOSPHATE SERPL-MCNC: 3.3 MG/DL (ref 2.5–4.9)
PLATELET # BLD AUTO: 205 10(3)UL (ref 150–450)
POTASSIUM SERPL-SCNC: 4.3 MMOL/L (ref 3.5–5.1)
POTASSIUM SERPL-SCNC: 4.5 MMOL/L (ref 3.5–5.1)
POTASSIUM SERPL-SCNC: 4.5 MMOL/L (ref 3.5–5.1)
POTASSIUM SERPL-SCNC: 4.6 MMOL/L (ref 3.5–5.1)
POTASSIUM UR-SCNC: 12.4 MMOL/L
PROT SERPL-MCNC: 6.5 G/DL (ref 6.4–8.2)
PROT UR STRIP.AUTO-MCNC: NEGATIVE MG/DL
RBC # BLD AUTO: 4 X10(6)UL
SODIUM SERPL-SCNC: 127 MMOL/L (ref 136–145)
SODIUM SERPL-SCNC: 127 MMOL/L (ref 136–145)
SODIUM SERPL-SCNC: 128 MMOL/L (ref 136–145)
SODIUM SERPL-SCNC: 128 MMOL/L (ref 136–145)
SODIUM SERPL-SCNC: 53 MMOL/L
SP GR UR STRIP.AUTO: 1 (ref 1–1.03)
TROPONIN I SERPL HS-MCNC: 6 NG/L
URATE SERPL-MCNC: 5.8 MG/DL
URATE UR-MCNC: 21.3 MG/DL
UROBILINOGEN UR STRIP.AUTO-MCNC: NORMAL MG/DL
WBC # BLD AUTO: 11.2 X10(3) UL (ref 4–11)

## 2024-04-02 PROCEDURE — 93306 TTE W/DOPPLER COMPLETE: CPT | Performed by: INTERNAL MEDICINE

## 2024-04-02 PROCEDURE — 99223 1ST HOSP IP/OBS HIGH 75: CPT | Performed by: INTERNAL MEDICINE

## 2024-04-02 RX ORDER — DIGOXIN 125 MCG
125 TABLET ORAL DAILY
COMMUNITY
End: 2024-04-04

## 2024-04-02 RX ORDER — CLONIDINE HYDROCHLORIDE 0.1 MG/1
0.1 TABLET ORAL 3 TIMES DAILY PRN
Status: DISCONTINUED | OUTPATIENT
Start: 2024-04-02 | End: 2024-04-04

## 2024-04-02 RX ORDER — DEXTROSE MONOHYDRATE 25 G/50ML
50 INJECTION, SOLUTION INTRAVENOUS
Status: DISCONTINUED | OUTPATIENT
Start: 2024-04-02 | End: 2024-04-04

## 2024-04-02 RX ORDER — NICOTINE POLACRILEX 4 MG
30 LOZENGE BUCCAL
Status: DISCONTINUED | OUTPATIENT
Start: 2024-04-02 | End: 2024-04-04

## 2024-04-02 RX ORDER — NICOTINE POLACRILEX 4 MG
15 LOZENGE BUCCAL
Status: DISCONTINUED | OUTPATIENT
Start: 2024-04-02 | End: 2024-04-04

## 2024-04-02 RX ORDER — FUROSEMIDE 20 MG/1
20 TABLET ORAL 2 TIMES DAILY
COMMUNITY

## 2024-04-02 RX ORDER — ACETAMINOPHEN 325 MG/1
650 TABLET ORAL EVERY 6 HOURS PRN
Status: DISCONTINUED | OUTPATIENT
Start: 2024-04-02 | End: 2024-04-04

## 2024-04-02 RX ORDER — FLUTICASONE FUROATE, UMECLIDINIUM BROMIDE AND VILANTEROL TRIFENATATE 200; 62.5; 25 UG/1; UG/1; UG/1
1 POWDER RESPIRATORY (INHALATION) DAILY
COMMUNITY

## 2024-04-02 RX ORDER — IPRATROPIUM BROMIDE AND ALBUTEROL SULFATE 2.5; .5 MG/3ML; MG/3ML
3 SOLUTION RESPIRATORY (INHALATION) EVERY 6 HOURS PRN
Status: DISCONTINUED | OUTPATIENT
Start: 2024-04-02 | End: 2024-04-04

## 2024-04-02 RX ORDER — ONDANSETRON 2 MG/ML
4 INJECTION INTRAMUSCULAR; INTRAVENOUS EVERY 6 HOURS PRN
Status: DISCONTINUED | OUTPATIENT
Start: 2024-04-02 | End: 2024-04-04

## 2024-04-02 RX ORDER — ENOXAPARIN SODIUM 100 MG/ML
40 INJECTION SUBCUTANEOUS DAILY
Status: DISCONTINUED | OUTPATIENT
Start: 2024-04-02 | End: 2024-04-02

## 2024-04-02 RX ORDER — DEXTROSE MONOHYDRATE 50 MG/ML
INJECTION, SOLUTION INTRAVENOUS CONTINUOUS
Status: DISCONTINUED | OUTPATIENT
Start: 2024-04-02 | End: 2024-04-02

## 2024-04-02 RX ORDER — CLONIDINE HYDROCHLORIDE 0.1 MG/1
0.1 TABLET ORAL 3 TIMES DAILY
COMMUNITY

## 2024-04-02 RX ORDER — BUPROPION HYDROCHLORIDE 150 MG/1
150 TABLET ORAL DAILY
COMMUNITY
Start: 2024-03-21

## 2024-04-02 RX ORDER — MAGNESIUM OXIDE 400 MG/1
400 TABLET ORAL ONCE
Status: COMPLETED | OUTPATIENT
Start: 2024-04-02 | End: 2024-04-02

## 2024-04-02 RX ORDER — CLONAZEPAM 1 MG/1
1 TABLET ORAL NIGHTLY PRN
COMMUNITY

## 2024-04-02 RX ORDER — METOCLOPRAMIDE HYDROCHLORIDE 5 MG/ML
5 INJECTION INTRAMUSCULAR; INTRAVENOUS EVERY 8 HOURS PRN
Status: DISCONTINUED | OUTPATIENT
Start: 2024-04-02 | End: 2024-04-04

## 2024-04-02 RX ORDER — CLONIDINE HYDROCHLORIDE 0.1 MG/1
0.1 TABLET ORAL 3 TIMES DAILY
Status: DISCONTINUED | OUTPATIENT
Start: 2024-04-02 | End: 2024-04-04

## 2024-04-02 NOTE — CONSULTS
Pulmonary / Critical Care H&P/Consult       NAME: Rahul Paul - ROOM: 468/468-A - MRN: [id number] - Age: 78 year old - :  1945    Date of Admission: 2024  6:29 AM  Admission Diagnosis: Bradycardia [R00.1]  Hyperkalemia [E87.5]  Hyponatremia [E87.1]    Reason for consult: icu      History of Present Illness: 79 yo F with h/o asthma, htn who presented with weakness, headache.  Found to be bradycardic with HR in 30s, hyponatremic Na 118, and hyperkalemic K 6.8; initially presented to [facility] and then transferred to ICU here.  On arrival Na improved as was K; HR improved to 60-70s as well.     Past Medical History:   Diagnosis Date    Diabetes mellitus (HCC)     Diverticulosis     Dyspnea 2016    H/O Helicobacter infection     Labile hypertension 2017    Rectal prolapse     Snoring 2018    DMG HST AHI 2 snoring hypoxia PLM index 116.3    Thyroid disease     Thyroid nodule     Type II or unspecified type diabetes mellitus without mention of complication, not stated as uncontrolled     Unspecified essential hypertension       Past Surgical History:   Procedure Laterality Date    COLONOSCOPY  2013    Procedure: COLONOSCOPY;  Surgeon: Jose Doan MD;  Location:  ENDOSCOPY    COLONOSCOPY & POLYPECTOMY      polyp    GASTRO - DMG      irregular Z line    HYSTERECTOMY      Complete     OTHER SURGICAL HISTORY      left knee    TOTAL ABDOM HYSTERECTOMY          Allergies:  Allergies   Allergen Reactions    Carvedilol DIZZINESS, FATIGUE, INSOMNIA, NAUSEA ONLY and Tightness in Chest    Hydralazine NAUSEA AND VOMITING    Lisinopril Coughing       Social History:  Social History     Socioeconomic History    Marital status:      Spouse name: Not on file    Number of children: Not on file    Years of education: Not on file    Highest education level: Not on file   Occupational History    Not on file   Tobacco Use    Smoking status: Never    Smokeless  tobacco: Never   Vaping Use    Vaping Use: Never used   Substance and Sexual Activity    Alcohol use: No     Alcohol/week: 0.0 standard drinks of alcohol    Drug use: Never    Sexual activity: Not Currently     Partners: Male   Other Topics Concern    Not on file   Social History Narrative    Not on file     Social Determinants of Health     Financial Resource Strain: Not on file   Food Insecurity: Not on file   Transportation Needs: Not on file   Physical Activity: Not on file   Stress: Not on file   Social Connections: Not on file   Housing Stability: Not on file        Family History:  Family History   Problem Relation Age of Onset    Heart Disorder Father     Diabetes Mother         Home Medications:  No outpatient medications have been marked as taking for the 4/2/24 encounter (Hospital Encounter).       Scheduled Medication:  Continuous Infusing Medication:  PRN Medication:     REVIEW OF SYSTEMS:   Reviewed and negative except per HPI    OBJECTIVE:  Vitals:    04/02/24 0633 04/02/24 0645 04/02/24 0700   BP: (!) 187/69 (!) 168/84 (!) 161/73   BP Location: Right arm     Pulse: 71 68 67   Resp: 15 14 18   Temp: 98.3 °F (36.8 °C)     TempSrc: Temporal     SpO2: 98% 100% 99%   Weight: 113 lb 15.7 oz (51.7 kg)       O2; 2L nc              Wt Readings from Last 3 Encounters:   04/02/24 113 lb 15.7 oz (51.7 kg)   07/07/23 117 lb 8.1 oz (53.3 kg)   02/25/22 113 lb 6 oz (51.4 kg)         Intake/Output Summary (Last 24 hours) at 4/2/2024 0737  Last data filed at 4/2/2024 0633  Gross per 24 hour   Intake --   Output 250 ml   Net -250 ml       BP (!) 161/73   Pulse 67   Temp 97.7 °F (36.5 °C) (Temporal)   Resp 18   Wt 113 lb 15.7 oz (51.7 kg)   SpO2 99%   BMI 23.82 kg/m²     General Appearance:    Alert, cooperative, no distress, appears stated age   Head:    Normocephalic, without obvious abnormality, atraumatic   Eyes:    PERRL, conjunctiva/corneas clear, EOM's intact   Neck:   Supple, symmetrical, trachea midline,  no adenopathy;        thyroid:  No enlargement/tenderness/nodules; no carotid    bruit or JVD   Back:     Symmetric, no curvature, ROM normal, no CVA tenderness   Lungs:     Clear to auscultation bilaterally, respirations unlabored   Chest wall:    No tenderness or deformity   Heart:    Regular rate and rhythm, S1 and S2 normal, no murmur, rub   or gallop   Abdomen:     Soft, non-tender, bowel sounds active all four quadrants,     no masses, no organomegaly   Extremities:   Extremities normal, atraumatic, no cyanosis or edema   Pulses:   2+ and symmetric all extremities   Skin:   Skin color, texture, turgor normal, no rashes or lesions   Lymph nodes:   Cervical, supraclavicular, and axillary nodes normal   Neurologic:   CNII-XII intact. Normal strength, sensation and reflexes       throughout       Recent Labs   Lab 04/02/24  0711   WBC 11.2*   HGB 10.3*   HCT 32.6*   .0     No results for input(s): \"INR\" in the last 168 hours.    Recent Labs   Lab 04/02/24  0711 04/02/24  0818   * 128*  128*   K 4.6 4.5  4.5   CL 92* 93*  93*   CO2 24.0 25.0  25.0   BUN 29* 29*  29*   CREATSERUM 1.12* 1.04*  1.04*   * 178*  178*   CA 9.7 9.9  9.9   AST  --  316*   ALT  --  413*   ALKPHO  --  137   BILT  --  0.4   ALB  --  3.4   TP  --  6.5         Creatinine, Serum (mg/dL)   Date Value   04/19/2016 0.83   03/13/2015 0.86   06/22/2013 0.79     Creatinine (mg/dL)   Date Value   07/07/2023 1.00   07/06/2023 0.89   07/05/2023 0.84   02/25/2022 0.83   11/13/2021 0.62   06/15/2021 0.74   ]    Lactic acid 3.9 (was 7 on admit to [place])      Imaging: cxr (osh, report reviewed): no infiltrate     ASSESSMENT/PLAN:    Bradycardia: suspect combination of electrolyte imbalance along with medication effect.    - echo pending  - off ionotrope support.   - cards following  Hyponatremia: acute on chronic   - renal consulted  - follow Na   Hyperkalemia: a new issue  - corrected in the ER.   - renal consulted.    Asthma: no exac  - cont trelegy, prn bd.   Dm: per IM   Fen: PO   Dispo: full code. ICU monitoring.         Shirley Martin M.D.  [facility]  Pulmonary / Critical care  4/2/2024

## 2024-04-02 NOTE — PROGRESS NOTES
Received report regarding the patient this morning. Pt is A&O x4 and follows commands. Pt is NSR and HR in the 60's. Weaned to 3L nasal cannula. Hypertension was treated with scheduled and PRN clonidine, per MD. Nephrologist ordered fluid restriction of 1500 mL. BMP Q12. See flowsheets and MAR for further information.

## 2024-04-02 NOTE — H&P
[facility] H&P/Consult note       CC: bradycardia         PCP: Jake Escalante MD    History of Present Illness: Patient is a 78 year old female with PMH sig for persistent AF, HTN, HLD, DM , hypothyroidism, mdd, recurrent hyponatremia here for dizziness/ chest pain.     She was at her usual state health this afternoon but then felt dizzy/  chest pain.   She went to [facility] and was found to be bradycardic with lactic acidosis, hyperk, hypona, transaminitis. Bradycardia was refractory to atropine. Received dopamine, epinephrine, insulin, glucagon.  Transferred to [place]    Currently her HR is in 60s. No chest pain. Feels tired, hasn't slept all night.     PMH  Past Medical History:   Diagnosis Date    Diabetes mellitus (HCC) 2004    Diverticulosis 2008    Dyspnea 4/13/2016    H/O Helicobacter infection     Labile hypertension 2/13/2017    Rectal prolapse     Snoring 05/03/2018    DMG HST AHI 2 snoring hypoxia PLM index 116.3    Thyroid disease     Thyroid nodule     Type II or unspecified type diabetes mellitus without mention of complication, not stated as uncontrolled     Unspecified essential hypertension         PSH  Past Surgical History:   Procedure Laterality Date    COLONOSCOPY  6/7/2013    Procedure: COLONOSCOPY;  Surgeon: Jose Doan MD;  Location:  ENDOSCOPY    COLONOSCOPY & POLYPECTOMY  6/13    polyp    GASTRO - DMG  6/13    irregular Z line    HYSTERECTOMY  2001    Complete     OTHER SURGICAL HISTORY      left knee    TOTAL ABDOM HYSTERECTOMY          ALL:  Allergies   Allergen Reactions    Carvedilol DIZZINESS, FATIGUE, INSOMNIA, NAUSEA ONLY and Tightness in Chest    Hydralazine NAUSEA AND VOMITING    Lisinopril Coughing        Home Medications:  She states she doesn't know her home meds      Soc Hx  Social History     Tobacco Use    Smoking status: Never    Smokeless tobacco: Never   Substance Use Topics    Alcohol use: No     Alcohol/week: 0.0 standard drinks of alcohol         Fam Hx  Family History   Problem Relation Age of Onset    Heart Disorder Father     Diabetes Mother        Review of Systems  Comprehensive ROS reviewed and negative except for what's stated above.         OBJECTIVE:  BP (!) 161/73   Pulse 67   Temp 98.3 °F (36.8 °C) (Temporal)   Resp 18   Wt 113 lb 15.7 oz (51.7 kg)   SpO2 99%   BMI 23.82 kg/m²   General:  Tired appearing   Head:  Normocephalic, without obvious abnormality, atraumatic.   Eyes:  Sclera anicteric,     Throat: dry   Neck: Supple    Lungs:   Coarse bs bl   Chest wall:  No tenderness or deformity.   Heart:  Regular rate and rhythm,    Abdomen:   Soft, NT/ND    Extremities: Trace edema   Skin: No visible rashes or lesions.    Neurologic: Strength symmetrical, no drft, deb ftn, no facial asymmetry       Diagnostic Data:    CBC/Chem  No results for input(s): \"WBC\", \"HGB\", \"MCV\", \"PLT\", \"BAND\", \"INR\" in the last 168 hours.    Invalid input(s): \"LYM#\", \"MONO#\", \"BASOS#\", \"EOSIN#\"    No results for input(s): \"NA\", \"K\", \"CL\", \"CO2\", \"BUN\", \"CREATSERUM\", \"GLU\", \"CA\", \"CAION\", \"MG\", \"PHOS\" in the last 168 hours.    No results for input(s): \"ALT\", \"AST\", \"ALB\", \"AMYLASE\", \"LIPASE\", \"LDH\" in the last 168 hours.    Invalid input(s): \"ALPHOS\", \"TBIL\", \"DBIL\", \"TPROT\"    No results for input(s): \"TROP\" in the last 168 hours.         Radiology: No results found.     ASSESSMENT / PLAN:      Patient is a 78 year old female with PMH sig for persistent AF, HTN, HLD, DM , hypothyroidism, mdd, recurrent hyponatremia here for dizziness/ chest pain.       Impression    -bradycardia    -severe hyponatremia (recurrent), Na 118 at OSH prior to transfer  -hyper K (6.8)  -metabolic acidosis, lactic acidosis  -transaminitis    -HTN  -HLD  -Persistent AF  -secondary hypercoag state    -pulm HTN    -DM 2  -hypothyroidism     -asthma   -mdd    Plan    *bradycardia  -?etiology, ?medication effect  -tsh ok  -now normal HR  -echo pending  -cards consult      *hyponatremia  -severe, overcorrecting. Repeat Na 127 this am  -will start d5w  -renal consult    *hyperkalemia  -sp treatment, now normalized    *transaminitis  -? If from hypoperfusion. Trend. No abd pain    *lactic acidosis  -? If from hypoperfusion vs infection  - no abd ttp.   - check ua , blood cx.   -trend     *chronic conditions  -home meds as able    Scds        Further recommendations pending patient's clinical course. [facility] hospitalist to continue to follow patient while in house    Patient and/or patient's family given opportunity to ask questions and note understanding and agreeing with therapeutic plan as outlined    Cal[facility] Hospitalist  [phone]  Answering Service: [phone]

## 2024-04-02 NOTE — OCCUPATIONAL THERAPY NOTE
OCCUPATIONAL THERAPY                    Patient admitted as a hospital transfer, had presented to [facility] ER with difficulty breathing, dizziness, and chest pain. Found to have hyponatremia, hyperkalemia, and bradycardia. Attempted to see patient but held until cardiology consult was completed.  On second attempt, patient was sleeping and RN requested to let patient rest. Will re-attempt at a later date as appropriate.

## 2024-04-02 NOTE — CONSULTS
Cardiology Consultation Note      Rahul Paul Patient Status:  Inpatient    1945 MRN [id number]   [facility] 4SW-A Attending Radhika Roblero MD   Hosp Day # 0 PCP Jake Escalante MD     Reason for Consultation:  Chest pain; bradycardia    Impression:  Chest pain  Sinus jovani  Persistent AF  Labile HTN; multiple medication intolerances  Hyperkalemia  Chronic hyponatremia    Plan:  I suspect bradycardia was triggered by hyperkalemia with in setting of digoxin/diltiazem/metoprolol  Agree with nephrology consultation. We have been using a medication regimen that specifically avoid hyponatremia, since this is a chronic issue for her  2D echo with Doppler  Serial cardiac enzymes  Check digoxin level  Eliquis for stroke prevention    Outpatient cardiologist: Dr. Salinas    History of Present Illness:  Rahul Paul is a 78 year old female who was admitted on 2024.    She developed intense fatigue, lightheadedness and R shoulder pain yesterday. She was taken to [facility]. She was found to bradycardic with lactic acidosis, hyperk, hypona, transaminitis. Bradycardia was refractory to atropine. Received dopamine, epinephrine, insulin, glucagon.  Transferred to [place]. Her heart rate has improved with correction of electrolytes.     Her outpatient regimen:  0.1 clonidine tid with prn  Metoprolol succinate 100mg bid  Diltiazem 180  Digoxin 0.125mg daily  Furosemide 20mg daily    Cardiology consultation was requested.    Medications:  Current Facility-Administered Medications   Medication Dose Route Frequency    fluticasone-umeclidin-vilant (Trelegy Ellipta) 200-62.5-25 MCG/ACT inhaler 1 puff  1 puff Inhalation Daily    ipratropium-albuterol (Duoneb) 0.5-2.5 (3) MG/3ML inhalation solution 3 mL  3 mL Nebulization Q6H PRN    [Held by provider] enoxaparin (Lovenox) 40 MG/0.4ML SUBQ injection 40 mg  40 mg Subcutaneous Daily    ondansetron (Zofran) 4 MG/2ML injection 4 mg  4 mg Intravenous  Q6H PRN    metoclopramide (Reglan) 5 mg/mL injection 5 mg  5 mg Intravenous Q8H PRN    dextrose 5% infusion   Intravenous Continuous    glucose (Dex4) 15 GM/59ML oral liquid 15 g  15 g Oral Q15 Min PRN    Or    glucose (Glutose) 40% oral gel 15 g  15 g Oral Q15 Min PRN    Or    glucose-vitamin C (Dex-4) chewable tab 4 tablet  4 tablet Oral Q15 Min PRN    Or    dextrose 50% injection 50 mL  50 mL Intravenous Q15 Min PRN    Or    glucose (Dex4) 15 GM/59ML oral liquid 30 g  30 g Oral Q15 Min PRN    Or    glucose (Glutose) 40% oral gel 30 g  30 g Oral Q15 Min PRN    Or    glucose-vitamin C (Dex-4) chewable tab 8 tablet  8 tablet Oral Q15 Min PRN    insulin aspart (NovoLOG) 100 Units/mL FlexPen 1-5 Units  1-5 Units Subcutaneous TID AC and HS       Past Medical History:   Diagnosis Date    Diabetes mellitus (HCC) 2004    Diverticulosis 2008    Dyspnea 4/13/2016    H/O Helicobacter infection     Labile hypertension 2/13/2017    Rectal prolapse     Snoring 05/03/2018    DMG HST AHI 2 snoring hypoxia PLM index 116.3    Thyroid disease     Thyroid nodule     Type II or unspecified type diabetes mellitus without mention of complication, not stated as uncontrolled     Unspecified essential hypertension        Past Surgical History:   Procedure Laterality Date    COLONOSCOPY  6/7/2013    Procedure: COLONOSCOPY;  Surgeon: Jose Doan MD;  Location:  ENDOSCOPY    COLONOSCOPY & POLYPECTOMY  6/13    polyp    GASTRO - DMG  6/13    irregular Z line    HYSTERECTOMY  2001    Complete     OTHER SURGICAL HISTORY      left knee    TOTAL ABDOM HYSTERECTOMY         Family History  family history includes Diabetes in her mother; Heart Disorder in her father.    Social History   reports that she has never smoked. She has never used smokeless tobacco. She reports that she does not drink alcohol and does not use drugs.    Allergies  Allergies   Allergen Reactions    Carvedilol DIZZINESS, FATIGUE, INSOMNIA, NAUSEA ONLY and Tightness in  Chest    Hydralazine NAUSEA AND VOMITING    Lisinopril Coughing         Review of Systems:  Constitutional: negative for fevers  Eyes: negative for visual disturbance  Ears, nose, mouth, throat, and face: negative for epistaxis  Respiratory: negative for dyspnea on exertion  Cardiovascular: negative for chest pain  Gastrointestinal: negative for melena  Genitourinary:negative for hematuria  Hematologic/lymphatic: negative for bleeding  Musculoskeletal:negative for myalgias  Neurological:+ for dizziness and headaches  Endocrine: negative for temperature intolerance      Physical Exam:  Blood pressure (!) 161/73, pulse 67, temperature 97.7 °F (36.5 °C), temperature source Temporal, resp. rate 18, weight 113 lb 15.7 oz (51.7 kg), SpO2 99%, not currently breastfeeding.  Temp (24hrs), Av °F (36.7 °C), Min:97.7 °F (36.5 °C), Max:98.3 °F (36.8 °C)    Wt Readings from Last 3 Encounters:   24 113 lb 15.7 oz (51.7 kg)   23 117 lb 8.1 oz (53.3 kg)   22 113 lb 6 oz (51.4 kg)       General: Awake and alert; in no acute distress  HEENT: Extraocular movements are intact; sclerae are anicteric  Neck: Supple; no JVD; no carotid bruits  Cardiac: Regular rate and rhythm; no murmurs/rubs/gallops are appreciated  Lungs: Clear to auscultation bilaterally; no accessory muscle use is noted  Abdomen: Soft, non-tender; bowel sounds are normoactive  Extremities: No clubbing or cyanosis; moves all 4 extremities normally  Psychiatric: Normal mood and affect; answers questions appropriately  Dermatologic: No rashes; normal skin turgor      Diagnostic testing:  EKG (2023): Normal sinus rhythm     Labs:   Lab Results   Component Value Date    INR 1.13 2023    INR 1.13 2023    INR 1.04 2023        Lab Results   Component Value Date    WBC 11.2 2024    HGB 10.3 2024    HCT 32.6 2024    .0 2024    CREATSERUM 1.04 2024    CREATSERUM 1.04 2024    BUN 29 2024     BUN 29 04/02/2024     04/02/2024     04/02/2024    K 4.5 04/02/2024    K 4.5 04/02/2024    CL 93 04/02/2024    CL 93 04/02/2024    CO2 25.0 04/02/2024    CO2 25.0 04/02/2024     04/02/2024     04/02/2024    CA 9.9 04/02/2024    CA 9.9 04/02/2024    ALB 3.4 04/02/2024    ALKPHO 137 04/02/2024    BILT 0.4 04/02/2024    TP 6.5 04/02/2024     04/02/2024     04/02/2024    MG 1.7 04/02/2024    PHOS 3.3 04/02/2024    PGLU 216 04/02/2024       Total visit time: 75 minutes    Thank you for allowing our practice to participate in the care of your patient. Please do not hesitate to contact me if you have any questions.    Saleem Salinas MD, FACC

## 2024-04-02 NOTE — CONSULTS
[facility] - Nephrology  Inpatient Consultation    Rahul Paul Patient Status:  Inpatient    1945 MRN [id number]   Location [facility] 4SW-A Attending Radhika Roblero MD   Hosp Day # 0 PCP Jake Escalante MD     Reason for consult: Hyponatremia, hyperkalemia  Date of consultation: 2024     HISTORY OF PRESENT ILLNESS:     Rahul Paul is a 78 year old female with a medical history notable for diabetes, hypertension, who presented with bradycardia. Nephrology consulted for hyponatremia, hyperkalemia.     Patient noted to have weakness at home. Went to outside hospital and found to be bradycardic with lactic acidosis. Multiple electrolyte abnormalities as well including hyponatremia and hyperkalemia. Transferred to hospital for further workup and management.     Patient feels better this morning. States she drinks fluids, but has tough time eating. Denies taking Duloxetine at home. Denies NSAID use.     REVIEW OF SYSTEMS:     ROS as above, otherwise negative    HISTORY:     Past Medical History:   Diagnosis Date    Diabetes mellitus (HCC) 2004    Diverticulosis     Dyspnea 2016    H/O Helicobacter infection     Labile hypertension 2017    Rectal prolapse     Snoring 2018    DMG HST AHI 2 snoring hypoxia PLM index 116.3    Thyroid disease     Thyroid nodule     Type II or unspecified type diabetes mellitus without mention of complication, not stated as uncontrolled     Unspecified essential hypertension      Past Surgical History:   Procedure Laterality Date    COLONOSCOPY  2013    Procedure: COLONOSCOPY;  Surgeon: Jose Doan MD;  Location:  ENDOSCOPY    COLONOSCOPY & POLYPECTOMY      polyp    GASTRO - DMG      irregular Z line    HYSTERECTOMY      Complete     OTHER SURGICAL HISTORY      left knee    TOTAL ABDOM HYSTERECTOMY       Family History   Problem Relation Age of Onset    Heart Disorder Father     Diabetes Mother       reports that she  has never smoked. She has never used smokeless tobacco. She reports that she does not drink alcohol and does not use drugs.  Allergies   Allergen Reactions    Carvedilol DIZZINESS, FATIGUE, INSOMNIA, NAUSEA ONLY and Tightness in Chest    Hydralazine NAUSEA AND VOMITING    Lisinopril Coughing       MEDICATIONS:       Current Facility-Administered Medications:     fluticasone-umeclidin-vilant (Trelegy Ellipta) 200-62.5-25 MCG/ACT inhaler 1 puff, 1 puff, Inhalation, Daily    ipratropium-albuterol (Duoneb) 0.5-2.5 (3) MG/3ML inhalation solution 3 mL, 3 mL, Nebulization, Q6H PRN    ondansetron (Zofran) 4 MG/2ML injection 4 mg, 4 mg, Intravenous, Q6H PRN    metoclopramide (Reglan) 5 mg/mL injection 5 mg, 5 mg, Intravenous, Q8H PRN    glucose (Dex4) 15 GM/59ML oral liquid 15 g, 15 g, Oral, Q15 Min PRN **OR** glucose (Glutose) 40% oral gel 15 g, 15 g, Oral, Q15 Min PRN **OR** glucose-vitamin C (Dex-4) chewable tab 4 tablet, 4 tablet, Oral, Q15 Min PRN **OR** dextrose 50% injection 50 mL, 50 mL, Intravenous, Q15 Min PRN **OR** glucose (Dex4) 15 GM/59ML oral liquid 30 g, 30 g, Oral, Q15 Min PRN **OR** glucose (Glutose) 40% oral gel 30 g, 30 g, Oral, Q15 Min PRN **OR** glucose-vitamin C (Dex-4) chewable tab 8 tablet, 8 tablet, Oral, Q15 Min PRN    insulin aspart (NovoLOG) 100 Units/mL FlexPen 1-5 Units, 1-5 Units, Subcutaneous, TID AC and HS    cloNIDine (Catapres) tab 0.1 mg, 0.1 mg, Oral, TID    cloNIDine (Catapres) tab 0.1 mg, 0.1 mg, Oral, TID PRN    apixaban (Eliquis) tab 5 mg, 5 mg, Oral, BID    Medications Prior to Admission   Medication Sig Dispense Refill Last Dose    furosemide 20 MG Oral Tab Take 1 tablet (20 mg total) by mouth 2 (two) times daily.       cloNIDine 0.1 MG Oral Tab Take 1 tablet (0.1 mg total) by mouth 3 (three) times daily. Additional 0.1mg PRN for SBP>180       digoxin 0.125 MG Oral Tab Take 1 tablet (125 mcg total) by mouth daily.       fluticasone-umeclidin-vilant (TRELEGY ELLIPTA) 200-62.5-25  MCG/ACT Inhalation Aerosol Powder, Breath Activated Inhale 1 puff into the lungs daily.       metFORMIN HCl 1000 MG Oral Tab Take 1 tablet (1,000 mg total) by mouth 2 (two) times daily with meals. With lunch and dinner       clonazePAM 1 MG Oral Tab Take 1 tablet (1 mg total) by mouth nightly as needed for Anxiety.       apixaban 5 MG Oral Tab Take 1 tablet (5 mg total) by mouth 2 (two) times daily. 60 tablet 1 4/1/2024    atorvastatin 10 MG Oral Tab Take 1 tablet (10 mg total) by mouth nightly. 90 tablet 3 4/1/2024    dilTIAZem  MG Oral Capsule SR 24 Hr Take 1 capsule (180 mg total) by mouth daily. 90 capsule 3 4/1/2024    levothyroxine 75 MCG Oral Tab Take 1 tablet (75 mcg total) by mouth before breakfast.   4/1/2024    metoprolol succinate 100 MG Oral Tablet 24 Hr Take 1 tablet (100 mg total) by mouth in the morning and 1 tablet (100 mg total) before bedtime. 90 tablet 3 4/1/2024    Montelukast Sodium 10 MG Oral Tab Take 1 tablet (10 mg total) by mouth nightly. 90 tablet 3 4/1/2024    Fluticasone Propionate 50 MCG/ACT Nasal Suspension 2 sprays by Each Nare route daily. 16 g 11 4/1/2024    Ferrous Sulfate  (45 Fe) MG Oral Tab CR Take 1 tablet (142 mg total) by mouth daily. 90 tablet 1     gabapentin 300 MG Oral Cap Take 1 capsule (300 mg total) by mouth 3 (three) times daily. 270 capsule 3 4/1/2024    Albuterol Sulfate HFA (PROAIR HFA) 108 (90 Base) MCG/ACT Inhalation Aero Soln Inhale 2 puffs into the lungs every 6 (six) hours as needed for Wheezing. 1 Inhaler 5     Olopatadine HCl (PATADAY) 0.2 % Ophthalmic Solution Place 1 drop into both eyes daily. 1 Bottle 6     BIOTIN OR Take  by mouth.   4/1/2024    Cholecalciferol (VITAMIN D-3 OR) Take  by mouth.   4/1/2024    Calcium Carbonate-Vit D-Min (CALCIUM 1200 OR) Take  by mouth.   4/1/2024    ALPRAZolam 1 MG Oral Tab Take 1 tablet (1 mg total) by mouth 3 (three) times daily as needed for Anxiety.       Glucose Blood (FREESTYLE LITE TEST) In Vitro Strip  1 lancet by Finger stick route 3 (three) times daily. 300 strip 3     metFORMIN 500 MG Oral Tab Take 1 tablet (500 mg total) by mouth 3 (three) times daily. (Patient taking differently: Take 1 tablet (500 mg total) by mouth daily with breakfast.) 270 tablet 3     FreeStyle Lancets Does not apply Misc USE FOR TESTING THREE TIMES DAILY 300 each 3     pantoprazole 40 MG Oral Tab EC TAKE 1 TABLET TWICE A DAY  BEFORE MEALS (Patient taking differently: Take 1 tablet (40 mg total) by mouth every morning before breakfast. TAKE 1 TABLET TWICE A DAY  BEFORE MEALS) 180 tablet 0     Magnesium Oxide 200 MG Oral Tab Take 200 mg by mouth daily. 90 tablet 1     Magnesium 200 MG Oral Tab Take 1 tablet (200 mg total) by mouth daily. 90 tablet 3     DULoxetine HCl 60 MG Oral Cap DR Particles Take 1 capsule (60 mg total) by mouth daily. 90 capsule 3     Blood Glucose Monitoring Suppl (FREESTYLE FREEDOM LITE) w/Device Does not apply Kit Use to test your blood sugar 3 times daily 1 kit 0     Glucose Blood (FREESTYLE LITE TEST) In Vitro Strip Test 3 times daily dx E11.9, non-insulin 300 strip 3     Fluticasone Furoate-Vilanterol (BREO ELLIPTA) 200-25 MCG/INH Inhalation Aerosol Powder, Breath Activated Inhale 1 puff into the lungs daily. 3 each 2     Umeclidinium Bromide (INCRUSE ELLIPTA) 62.5 MCG/INH Inhalation Aerosol Powder, Breath Activated Inhale 1 puff into the lungs daily. 3 each 2     Cyclobenzaprine HCl 10 MG Oral Tab Take 1 tablet (10 mg total) by mouth nightly. 30 tablet 1     TRIAMCINOLONE ACETONIDE 0.025 % External Cream mix equally with cerave and apply to affected area twice a day 454 g 0         PHYSICAL EXAM:     Vital Signs: /62   Pulse 62   Temp 97.5 °F (36.4 °C) (Temporal)   Resp 13   Wt 113 lb 15.7 oz (51.7 kg)   SpO2 95%   BMI 23.82 kg/m²   Temp (24hrs), Av.8 °F (36.6 °C), Min:97.5 °F (36.4 °C), Max:98.3 °F (36.8 °C)       Intake/Output Summary (Last 24 hours) at 2024 1410  Last data filed at  4/2/2024 1119  Gross per 24 hour   Intake 170.6 ml   Output 1075 ml   Net -904.4 ml     Wt Readings from Last 3 Encounters:   04/02/24 113 lb 15.7 oz (51.7 kg)   07/07/23 117 lb 8.1 oz (53.3 kg)   02/25/22 113 lb 6 oz (51.4 kg)       General: no acute distress  HEENT: normocephalic, atraumatic  CV: RRR  Respiratory: no respiratory distress  Abdomen: soft, non-tender  Extremities: no edema bilaterally  Skin: no rashes on visible skin  Neuro: awake, alert    LABORATORY DATA:     Lab Results   Component Value Date     (H) 04/02/2024    BUN 26 (H) 04/02/2024    BUNCREA 24.0 (H) 02/25/2022    CREATSERUM 1.20 (H) 04/02/2024    ANIONGAP 9 04/02/2024    GFR 74 08/19/2016    GFRNAA 75 12/08/2021    GFRAA 86 12/08/2021    CA 9.4 04/02/2024    OSMOCALC 276 04/02/2024    ALKPHO 137 04/02/2024     (H) 04/02/2024     (H) 04/02/2024    BILT 0.4 04/02/2024    TP 6.5 04/02/2024    ALB 3.4 04/02/2024    GLOBULIN 3.1 04/02/2024    AGRATIO 2.0 03/13/2015     (L) 04/02/2024    K 4.3 04/02/2024    CL 92 (L) 04/02/2024    CO2 26.0 04/02/2024     Lab Results   Component Value Date    WBC 11.2 (H) 04/02/2024    RBC 4.00 04/02/2024    HGB 10.3 (L) 04/02/2024    HCT 32.6 (L) 04/02/2024    .0 04/02/2024    MCV 81.5 04/02/2024    MCH 25.8 (L) 04/02/2024    MCHC 31.6 04/02/2024    RDW 14.5 04/02/2024    NEPRELIM 10.64 (H) 07/04/2023    NEPERCENT 81.7 07/04/2023    LYPERCENT 10.1 07/04/2023    MOPERCENT 7.4 07/04/2023    EOPERCENT 0.0 07/04/2023    BAPERCENT 0.2 07/04/2023    NE 10.64 (H) 07/04/2023    LYMABS 1.32 07/04/2023    MOABSO 0.96 07/04/2023    EOABSO 0.00 07/04/2023    BAABSO 0.02 07/04/2023     Lab Results   Component Value Date    MALBP <1.2 04/12/2021    CREUR 94.85 04/12/2021    MICROALBUMIN <0.2 01/05/2011    CREAUR 32 01/05/2011    MALBCREACALC (A) 01/05/2011      Comment:      The microalbumin value is less than 0.2 mg/dL  therefore we are unable to calculate excretion  and/or creatinine ratio.          The ADA defines abnormalities in albumin  excretion as follows:     Category         Result (mcg/mg creatinine)     Normal                    <30  Microalbuminuria            Clinical albuminuria   > OR = 300     The ADA recommends that at least two of three  specimens collected within a 3-6 month period be  abnormal before considering a patient to be  within a diagnostic category.     Lab Results   Component Value Date    COLORUR Colorless (A) 04/02/2024    CLARITY Clear 04/02/2024    SPECGRAVITY 1.005 04/02/2024    GLUUR Normal 04/02/2024    BILUR Negative 04/02/2024    KETUR Negative 04/02/2024    BLOODURINE Trace (A) 04/02/2024    PHURINE 5.5 04/02/2024    PROUR Negative 04/02/2024    UROBILINOGEN Normal 04/02/2024    NITRITE Negative 04/02/2024    LEUUR 25 (A) 04/02/2024    NMIC Microscopic not indicated 05/04/2021    WBCUR 1-5 04/02/2024    RBCUR 3-5 (A) 04/02/2024    EPIUR None Seen 04/02/2024    BACUR None Seen 04/02/2024    HYLUR NONE SEEN 01/05/2011       IMAGING:     Reviewed    ASSESSMENT:     # Non-oliguric CHUCK  -Elevated creatinine (baseline around 0.9-1.0) likely pre-renal/ATN   -Urinalysis with trace hematuria, leukocyte esterase    # HTN/Volume Status  -Home medications: metoprolol 100mg BID, diltiazem 180mg daily, digoxin 0.125mg daily, clonidine 0.1mg TID, furosemide 20mg daily    # Hyponatremia  -Chronic, averages usually around 130  -Likely secondary to tea/toast diet, polydipsia  -118 on outside hospital labs - actually 124 when corrected for hyperglycemia  -NOT taking duloxetine at home    # Hyperkalemia  -Resolved    # Anemia    PLAN:     -Monitor off IVF at this time  -Encourage PO intake with high protein diet  -Fluid restriction 1.5L daily  -Goal sodium should be 130-132 by midnight  -BMP q12 hours  -Please page nephrology if sodium <124 or >132  -Hold home diuretics currently  -Avoid nephrotoxins and renally dose medications for creatinine clearance  -Monitor intake and output  daily  -Daily weights        Discussed with RN, Hospitalist     We will continue to follow.    Thank you for allowing us to participate in the care of this patient.         DO Marcelo Geronimo [facility] - Nephrology

## 2024-04-02 NOTE — CM/SW NOTE
Incoming Information  Referring Facility: [facility]  Source Encounter  Admission Date: 04/02/24  Admission Time: 0500  Current Level of Care: ER  Transfer Information  Expected Arrival Date: 04/02/24  Expected Arrival Time: 0500  Transfer Reason: Higher level of care  Accepting MD: Yes  Name of Accepting MD: Radhika Roblero  Accepting Specialist(s): Dr. Los Myers (Intensivist)  Diagnosis: Bradycardia, Hyperkalemia, Hyponatremia  COVID Results: Negative  Insurance and Verification: Yes, verified  Requested Patient Class: Inpatient  Bed Requested: ICU  Transportation Information  Transfer Service Provider: [facility] to arrange        Patient is going to .  RN to RN report: Noah [phone].

## 2024-04-02 NOTE — PHYSICAL THERAPY NOTE
Physical therapy consult requested.  Pt is a 77 y/o F transferred from [facility] with bradycardia, severe hyponatremia, and hyperkalemia.  Attempted to evaluate pt twice today.  This morning, therapy held as awaiting cardiology consult.  This afternoon, pt is sleeping.  RN reports that pt did not sleep all night and requests pt be allowed to rest at this time.  Will plan to re-attempt to evaluate pt again tomorrow.

## 2024-04-03 ENCOUNTER — APPOINTMENT (OUTPATIENT)
Dept: GENERAL RADIOLOGY | Facility: HOSPITAL | Age: 79
End: 2024-04-03
Attending: INTERNAL MEDICINE
Payer: MEDICARE

## 2024-04-03 LAB
ALBUMIN SERPL-MCNC: 2.9 G/DL (ref 3.4–5)
ALBUMIN/GLOB SERPL: 1 {RATIO} (ref 1–2)
ALP LIVER SERPL-CCNC: 111 U/L
ALT SERPL-CCNC: 250 U/L
ANION GAP SERPL CALC-SCNC: 3 MMOL/L (ref 0–18)
ANION GAP SERPL CALC-SCNC: 5 MMOL/L (ref 0–18)
ANION GAP SERPL CALC-SCNC: 5 MMOL/L (ref 0–18)
AST SERPL-CCNC: 65 U/L (ref 15–37)
BILIRUB SERPL-MCNC: 0.4 MG/DL (ref 0.1–2)
BUN BLD-MCNC: 23 MG/DL (ref 9–23)
BUN BLD-MCNC: 24 MG/DL (ref 9–23)
BUN BLD-MCNC: 26 MG/DL (ref 9–23)
CALCIUM BLD-MCNC: 8.7 MG/DL (ref 8.5–10.1)
CALCIUM BLD-MCNC: 8.7 MG/DL (ref 8.5–10.1)
CALCIUM BLD-MCNC: 9.4 MG/DL (ref 8.5–10.1)
CHLORIDE SERPL-SCNC: 92 MMOL/L (ref 98–112)
CHLORIDE SERPL-SCNC: 95 MMOL/L (ref 98–112)
CHLORIDE SERPL-SCNC: 96 MMOL/L (ref 98–112)
CO2 SERPL-SCNC: 29 MMOL/L (ref 21–32)
CO2 SERPL-SCNC: 32 MMOL/L (ref 21–32)
CO2 SERPL-SCNC: 33 MMOL/L (ref 21–32)
CREAT BLD-MCNC: 0.97 MG/DL
CREAT BLD-MCNC: 0.98 MG/DL
CREAT BLD-MCNC: 1.04 MG/DL
DEPRECATED HBV CORE AB SER IA-ACNC: 50.6 NG/ML
EGFRCR SERPLBLD CKD-EPI 2021: 55 ML/MIN/1.73M2 (ref 60–?)
EGFRCR SERPLBLD CKD-EPI 2021: 59 ML/MIN/1.73M2 (ref 60–?)
EGFRCR SERPLBLD CKD-EPI 2021: 59 ML/MIN/1.73M2 (ref 60–?)
ERYTHROCYTE [DISTWIDTH] IN BLOOD BY AUTOMATED COUNT: 14.6 %
GLOBULIN PLAS-MCNC: 2.8 G/DL (ref 2.8–4.4)
GLUCOSE BLD-MCNC: 116 MG/DL (ref 70–99)
GLUCOSE BLD-MCNC: 122 MG/DL (ref 70–99)
GLUCOSE BLD-MCNC: 152 MG/DL (ref 70–99)
GLUCOSE BLD-MCNC: 158 MG/DL (ref 70–99)
GLUCOSE BLD-MCNC: 159 MG/DL (ref 70–99)
GLUCOSE BLD-MCNC: 202 MG/DL (ref 70–99)
GLUCOSE BLD-MCNC: 301 MG/DL (ref 70–99)
HCT VFR BLD AUTO: 26.5 %
HGB BLD-MCNC: 8.9 G/DL
IRON SATN MFR SERPL: 8 %
IRON SERPL-MCNC: 32 UG/DL
MAGNESIUM SERPL-MCNC: 1.8 MG/DL (ref 1.6–2.6)
MCH RBC QN AUTO: 26.3 PG (ref 26–34)
MCHC RBC AUTO-ENTMCNC: 33.6 G/DL (ref 31–37)
MCV RBC AUTO: 78.4 FL
OSMOLALITY SERPL CALC.SUM OF ELEC: 274 MOSM/KG (ref 275–295)
OSMOLALITY SERPL CALC.SUM OF ELEC: 277 MOSM/KG (ref 275–295)
OSMOLALITY SERPL CALC.SUM OF ELEC: 277 MOSM/KG (ref 275–295)
PLATELET # BLD AUTO: 165 10(3)UL (ref 150–450)
POTASSIUM SERPL-SCNC: 4.6 MMOL/L (ref 3.5–5.1)
POTASSIUM SERPL-SCNC: 4.7 MMOL/L (ref 3.5–5.1)
POTASSIUM SERPL-SCNC: 4.7 MMOL/L (ref 3.5–5.1)
PROT SERPL-MCNC: 5.7 G/DL (ref 6.4–8.2)
RBC # BLD AUTO: 3.38 X10(6)UL
SODIUM SERPL-SCNC: 129 MMOL/L (ref 136–145)
SODIUM SERPL-SCNC: 130 MMOL/L (ref 136–145)
SODIUM SERPL-SCNC: 131 MMOL/L (ref 136–145)
TIBC SERPL-MCNC: 378 UG/DL (ref 240–450)
TRANSFERRIN SERPL-MCNC: 254 MG/DL (ref 200–360)
WBC # BLD AUTO: 8.3 X10(3) UL (ref 4–11)

## 2024-04-03 PROCEDURE — 99233 SBSQ HOSP IP/OBS HIGH 50: CPT | Performed by: INTERNAL MEDICINE

## 2024-04-03 PROCEDURE — 71045 X-RAY EXAM CHEST 1 VIEW: CPT | Performed by: INTERNAL MEDICINE

## 2024-04-03 RX ORDER — MAGNESIUM SULFATE HEPTAHYDRATE 40 MG/ML
2 INJECTION, SOLUTION INTRAVENOUS ONCE
Status: COMPLETED | OUTPATIENT
Start: 2024-04-03 | End: 2024-04-03

## 2024-04-03 RX ORDER — AMLODIPINE BESYLATE 2.5 MG/1
2.5 TABLET ORAL DAILY
Status: DISCONTINUED | OUTPATIENT
Start: 2024-04-03 | End: 2024-04-04

## 2024-04-03 NOTE — PROGRESS NOTES
[facility] Nephrology  Inpatient Follow-up    Rahul Paul Patient Status:  Inpatient    1945 MRN [id number]   LTAC, located within [facility] 4SW-A Attending Radhika Roblero MD   Hosp Day # 1 PCP Jake Escalante MD       SUBJECTIVE:     Patient seen and examined at bedside.     MEDICATIONS:     Current Facility-Administered Medications   Medication Dose Route Frequency    iron sucrose (Venofer) 20 mg/mL injection 200 mg  200 mg Intravenous Daily    fluticasone-umeclidin-vilant (Trelegy Ellipta) 200-62.5-25 MCG/ACT inhaler 1 puff  1 puff Inhalation Daily    ipratropium-albuterol (Duoneb) 0.5-2.5 (3) MG/3ML inhalation solution 3 mL  3 mL Nebulization Q6H PRN    ondansetron (Zofran) 4 MG/2ML injection 4 mg  4 mg Intravenous Q6H PRN    metoclopramide (Reglan) 5 mg/mL injection 5 mg  5 mg Intravenous Q8H PRN    glucose (Dex4) 15 GM/59ML oral liquid 15 g  15 g Oral Q15 Min PRN    Or    glucose (Glutose) 40% oral gel 15 g  15 g Oral Q15 Min PRN    Or    glucose-vitamin C (Dex-4) chewable tab 4 tablet  4 tablet Oral Q15 Min PRN    Or    dextrose 50% injection 50 mL  50 mL Intravenous Q15 Min PRN    Or    glucose (Dex4) 15 GM/59ML oral liquid 30 g  30 g Oral Q15 Min PRN    Or    glucose (Glutose) 40% oral gel 30 g  30 g Oral Q15 Min PRN    Or    glucose-vitamin C (Dex-4) chewable tab 8 tablet  8 tablet Oral Q15 Min PRN    cloNIDine (Catapres) tab 0.1 mg  0.1 mg Oral TID    cloNIDine (Catapres) tab 0.1 mg  0.1 mg Oral TID PRN    apixaban (Eliquis) tab 5 mg  5 mg Oral BID    acetaminophen (Tylenol) tab 650 mg  650 mg Oral Q6H PRN    insulin aspart (NovoLOG) 100 Units/mL FlexPen 2-10 Units  2-10 Units Subcutaneous TID AC and HS       PHYSICAL EXAM:     Vital Signs: /49 (BP Location: Left arm)   Pulse 67   Temp 98 °F (36.7 °C) (Temporal)   Resp 19   Wt 124 lb 5.4 oz (56.4 kg)   SpO2 97%   BMI 25.99 kg/m²   Temp (24hrs), Av.8 °F (36.6 °C), Min:97.4 °F (36.3 °C), Max:98.2 °F (36.8 °C)       Intake/Output  Summary (Last 24 hours) at 4/3/2024 1304  Last data filed at 4/3/2024 1200  Gross per 24 hour   Intake 970 ml   Output 2560 ml   Net -1590 ml     Wt Readings from Last 3 Encounters:   04/03/24 124 lb 5.4 oz (56.4 kg)   07/07/23 117 lb 8.1 oz (53.3 kg)   02/25/22 113 lb 6 oz (51.4 kg)       General: no acute distress  HEENT: normocephalic, atraumatic  CV: RRR  Respiratory: no distress  Abdomen: soft, non-tender  Extremities: no edema bilaterally  Skin: no rashes on visible skin  Neuro: awake, alert     LABORATORY DATA:     Lab Results   Component Value Date     (H) 04/03/2024    BUN 23 04/03/2024    BUNCREA 24.0 (H) 02/25/2022    CREATSERUM 0.97 04/03/2024    ANIONGAP 5 04/03/2024    GFR 74 08/19/2016    GFRNAA 75 12/08/2021    GFRAA 86 12/08/2021    CA 9.4 04/03/2024    OSMOCALC 277 04/03/2024    ALKPHO 111 04/03/2024    AST 65 (H) 04/03/2024     (H) 04/03/2024    BILT 0.4 04/03/2024    TP 5.7 (L) 04/03/2024    ALB 2.9 (L) 04/03/2024    GLOBULIN 2.8 04/03/2024    AGRATIO 2.0 03/13/2015     (L) 04/03/2024    K 4.7 04/03/2024    CL 92 (L) 04/03/2024    CO2 33.0 (H) 04/03/2024     Lab Results   Component Value Date    WBC 8.3 04/03/2024    RBC 3.38 (L) 04/03/2024    HGB 8.9 (L) 04/03/2024    HCT 26.5 (L) 04/03/2024    .0 04/03/2024    MCV 78.4 (L) 04/03/2024    MCH 26.3 04/03/2024    MCHC 33.6 04/03/2024    RDW 14.6 04/03/2024    NEPRELIM 10.64 (H) 07/04/2023    NEPERCENT 81.7 07/04/2023    LYPERCENT 10.1 07/04/2023    MOPERCENT 7.4 07/04/2023    EOPERCENT 0.0 07/04/2023    BAPERCENT 0.2 07/04/2023    NE 10.64 (H) 07/04/2023    LYMABS 1.32 07/04/2023    MOABSO 0.96 07/04/2023    EOABSO 0.00 07/04/2023    BAABSO 0.02 07/04/2023     Lab Results   Component Value Date    MALBP <1.2 04/12/2021    CREUR 94.85 04/12/2021    MICROALBUMIN <0.2 01/05/2011    CREAUR 32 01/05/2011    MALBCREACALC (A) 01/05/2011      Comment:      The microalbumin value is less than 0.2 mg/dL  therefore we are unable to  calculate excretion  and/or creatinine ratio.         The ADA defines abnormalities in albumin  excretion as follows:     Category         Result (mcg/mg creatinine)     Normal                    <30  Microalbuminuria            Clinical albuminuria   > OR = 300     The ADA recommends that at least two of three  specimens collected within a 3-6 month period be  abnormal before considering a patient to be  within a diagnostic category.     Lab Results   Component Value Date    COLORUR Colorless (A) 04/02/2024    CLARITY Clear 04/02/2024    SPECGRAVITY 1.005 04/02/2024    GLUUR Normal 04/02/2024    BILUR Negative 04/02/2024    KETUR Negative 04/02/2024    BLOODURINE Trace (A) 04/02/2024    PHURINE 5.5 04/02/2024    PROUR Negative 04/02/2024    UROBILINOGEN Normal 04/02/2024    NITRITE Negative 04/02/2024    LEUUR 25 (A) 04/02/2024    NMIC Microscopic not indicated 05/04/2021    WBCUR 1-5 04/02/2024    RBCUR 3-5 (A) 04/02/2024    EPIUR None Seen 04/02/2024    BACUR None Seen 04/02/2024    HYLUR NONE SEEN 01/05/2011       IMAGING:     Reviewed    ASSESSMENT:      # Non-oliguric CHUCK - improving   -Elevated creatinine (baseline around 0.9-1.0) likely pre-renal/ATN   -Urinalysis with trace hematuria, leukocyte esterase     # HTN/Volume Status  -Home medications: metoprolol 100mg BID, diltiazem 180mg daily, digoxin 0.125mg daily, clonidine 0.1mg TID, furosemide 20mg daily     # Hyponatremia  -Chronic, averages usually around 130  -Likely secondary to tea/toast diet, polydipsia  -118 on outside hospital labs - actually 124 when corrected for hyperglycemia  -NOT taking duloxetine at home     # Hyperkalemia  -Resolved     # Anemia  -Iron deficiency     PLAN:      -Encourage PO intake with high protein diet  -Fluid restriction 1.5L daily  -Hold home diuretics currently  -IV iron x5  -Avoid nephrotoxins and renally dose medications for creatinine clearance  -Monitor intake and output daily  -Daily weights          We will  continue to follow.    Thank you for allowing us to participate in the care of this patient.       DO Marcelo Geronimo [facility] - Nephrology

## 2024-04-03 NOTE — OCCUPATIONAL THERAPY NOTE
OCCUPATIONAL THERAPY EVALUATION - INPATIENT     Room Number: 468/468-A  Evaluation Date: 4/3/2024  Type of Evaluation: Initial  Presenting Problem: hyponatremia, hyperkalemia    Physician Order: IP Consult to Occupational Therapy  Reason for Therapy: ADL/IADL Dysfunction and Discharge Planning    OCCUPATIONAL THERAPY ASSESSMENT   Patient is currently functioning near baseline with toileting, bathing, upper body dressing, lower body dressing, grooming, transfers, stating sitting balance, dynamic sitting balance, static standing balance, dynamic standing balance, and functional standing tolerance. Prior to admission, patient's baseline is modified independent with toileting, grooming, dressing; supervision for bathing; assisted with homemaking and driving.  Patient is requiring contact guard assist as a result of the following impairments: decreased functional reach, impaired dynamic sitting and standing balance, increased O2 needs from baseline, and decreased attention . Occupational Therapy will continue to follow for duration of hospitalization.    Patient will benefit from continued skilled OT Services For duration of hospitalization, however, given the patient is functioning near baseline level do not anticipate skilled therapy needs at discharge       History Related to Current Admission: Patient is a 78 year old female admitted on 4/2/2024 with Presenting Problem: hyponatremia, hyperkalemia. Co-Morbidities : AF, recurrent hyponatremia, HTN, HLD, MDD, hypothyroidism, frequent falls    Admitted from home with dizziness and chest pain. Found to have hyponatremia and hyperkalemia.       WEIGHT BEARING RESTRICTION  Weight Bearing Restriction: None            Recommendations for nursing staff:   Transfers: 1 person RW  Toileting location: bathroom     EVALUATION SESSION:  Patient Start of Session: supine  FUNCTIONAL TRANSFER ASSESSMENT  Sit to Stand: Edge of Bed  Edge of Bed: Contact Guard Assist  Toilet Transfer:  Contact Guard Assist    BED MOBILITY  Supine to Sit : Supervision  Scooting: SUP    BALANCE ASSESSMENT  Static Sitting: Stand-by Assist  Sitting Bilateral: Contact Guard Assist  Static Standing: Stand-by Assist  Standing Bilateral: Contact Guard Assist    FUNCTIONAL ADL ASSESSMENT       ACTIVITY TOLERANCE: O2 95% on 2.5-3L. Denied increase in shortness of breath with movility                O2 SATURATIONS  Oxygen Therapy  SPO2% on Oxygen at Rest: 95  At rest oxygen flow (liters per minute): 2.5    COGNITION  Arousal/Alertness:  appropriate responses to stimuli  Attention Span:  attends with cues to redirect, difficulty attending to directions, and difficulty dividing attention  Orientation Level:  oriented x4  Following Commands:  follows one step commands without difficulty  Initiation: appears intact  Problem Solving:  able to problem solve independently  Impulsive, distractible    Upper Extremity   ROM: within functional limits   Strength: within functional limits   Coordination  Gross motor: wfl  Fine motor: wfl  Sensation: denied any UE  deficits    EDUCATION PROVIDED  Patient: Role of Occupational Therapy; Plan of Care; Functional Transfer Techniques; Fall Prevention  Patient's Response to Education: Verbalized Understanding; Requires Further Education    Equipment used: RW, gait belt  Would benefit from additional trial yes     Therapist comments:  Increased time needed to gather prior level of function due to patient with decreased attention/concentration. Patient assisted to don slip on slides , but they seemed too slippery and patient was starting to lean too far forward at edge of bed while trying to manage shoes.  OT provided MAX A  to don non skid socks instead. Patient completed functional mobility with RW for approx 120 feet with CGA and good tolerance. OT cued patient for safe hand placement on GB for toilet transfers. OT cued patient for safe RW integration for standing hand hygiene at sink. Patient  cued to call staff for assistance to get up from chair.     Patient End of Session: RN aware of session/findings;Call light within reach;Needs met;Up in chair;All patient questions and concerns addressed;Alarm set    OCCUPATIONAL PROFILE    HOME SITUATION  Type of Home: House  Home Layout: Multi-level;Other (Comment) (lives on one level of the home, does not walk up/down stairs)  Lives With: Spouse;Caregiver part-time    Toilet and Equipment: Standard height toilet     Other Equipment: Other (Comment) (rw)    Occupation/Status: retired     Drives: No  Patient Regularly Uses: Glasses    Prior Level of Function:   Patient lives with her spouse in a house. No longer drives for the past 2 years.  She recently began using a RW a few days ago at her daughter's urging due to several recent falls at home. Modified independent with RW for toileting grooming, dressing. Has supervision for showers from either her CG (4 hours/day) or her spouse's CG ( daily 8:30am-1:00pm and again from  3:00--7:00pm). Spouse pollo had a caregiver for the past 3 years.  Has a  who comes to her home, and goes to OP PT. Reported that her caregiver's spouse is the person who cooks most of the food. Patient's son and patient's CG assist with transportation. Patient reported that her daughter is physician.     SUBJECTIVE   Tangential, pleasant, participatory    PAIN ASSESSMENT  Ratin          OBJECTIVE  Precautions: Bed/chair alarm  Fall Risk: High fall risk      ASSESSMENTS    AM-PAC ‘6-Clicks’ Inpatient Daily Activity Short Form  -   Putting on and taking off regular lower body clothing?: A Little  -   Bathing (including washing, rinsing, drying)?: A Little  -   Toileting, which includes using toilet, bedpan or urinal? : A Little  -   Putting on and taking off regular upper body clothing?: None  -   Taking care of personal grooming such as brushing teeth?: None  -   Eating meals?: None    AM-PAC Score:  Score: 21  Approx Degree  of Impairment: 32.79%  Standardized Score (AM-PAC Scale): 44.27    ADDITIONAL TESTS     NEUROLOGICAL FINDINGS      COGNITION ASSESSMENTS       PLAN  OT Treatment Plan: Endurance training;Functional transfer training;ADL training;Energy conservation/work simplification techniques;Equipment eval/education;Patient/Family training;Compensatory technique education;Patient/Family education  Rehab Potential : Good  Frequency: 3-5x/week  Number of Visits to Meet Established Goals: 4    ADL Goals   Patient will perform grooming: with supervision and while standing at sink  Patient will perform upper body dressing:  with supervision  Patient will perform lower body dressing:  with supervision and with adaptive equipment PRN  Patient will perform toileting: with supervision    Functional Transfer Goals  Patient will transfer to toilet:  with supervision and good safety awareness    UE Exercise Program Goal  Patient will be supervision with bilateral AROM HEP (home exercise program).    Additional Goals  Patient will consistently demonstrate safe RW integration during ADLs and functional mobility    Therapist Goals  SBT       Patient Evaluation Complexity Level:   Occupational Profile/Medical History MODERATE - Expanded review of history including review of medical or therapy record   Specific performance deficits impacting engagement in ADL/IADL MODERATE  3 - 5 performance deficits   Client Assessment/Performance Deficits MODERATE - Comorbidities and min to mod modifications of tasks    Clinical Decision Making MODERATE - Analysis of occupational profile, detailed assessments, several treatment options    Overall Complexity MODERATE     OT Session Time: 35 minutes  Self-Care Home Management: 5 minutes  Therapeutic Activity: 25 minutes

## 2024-04-03 NOTE — PROGRESS NOTES
[facility] Patient Status:  Inpatient    1945 MRN [id number]   Location [facility] 4SW-A Attending Radhika Roblero MD   Hosp Day # 1 PCP Jake Escalante MD     Pulm / Critical Care Progress Note     S: feels much better today       Scheduled Medication:   fluticasone-umeclidin-vilant  1 puff Inhalation Daily    cloNIDine  0.1 mg Oral TID    apixaban  5 mg Oral BID    insulin aspart  2-10 Units Subcutaneous TID AC and HS     Continuous Infusing Medication:  PRN Medication:ipratropium-albuterol, ondansetron, metoclopramide, glucose **OR** glucose **OR** glucose-vitamin C **OR** dextrose **OR** glucose **OR** glucose **OR** glucose-vitamin C, cloNIDine, acetaminophen       OBJECTIVE:  Vitals:    24 0300 24 0400 24 0500 24 0600   BP: 127/78 137/76 (!) 144/94 154/79   BP Location:  Left arm     Pulse: 63 59 58 60   Resp: 15 20 13 12   Temp:  97.4 °F (36.3 °C)     TempSrc:  Temporal     SpO2: 99% 99% 99% 99%   Weight:    124 lb 5.4 oz (56.4 kg)     O2: 2-3L nc       Wt Readings from Last 3 Encounters:   24 124 lb 5.4 oz (56.4 kg)   23 117 lb 8.1 oz (53.3 kg)   22 113 lb 6 oz (51.4 kg)        I/O last 3 completed shifts:  In: 1400.6 [P.O.:1180; I.V.:170.6; IV PIGGYBACK:50]  Out: 3300 [Urine:3300]  No intake/output data recorded.     Physical Exam:   General: alert, cooperative, No respiratory distress.   Head: Normocephalic, without obvious abnormality, atraumatic.   Lungs:ctab   Chest wall: No tenderness or deformity.   Heart: Regular rate and rhythm, normal S1S2, no murmur.   Abdomen: soft, non-tender, non-distended, positive BS.   Extremity: no edema     Recent Labs   Lab 24  0711 24  0401   WBC 11.2* 8.3   HGB 10.3* 8.9*   HCT 32.6* 26.5*   .0 165.0     No results for input(s): \"INR\" in the last 168 hours.      Recent Labs   Lab 24  0818 24  1210 24  0123 24  0401   *  128* 127* 129* 131*   K 4.5   4.5 4.3 4.7 4.6   CL 93*  93* 92* 95* 96*   CO2 25.0  25.0 26.0 29.0 32.0   BUN 29*  29* 26* 26* 24*   CREATSERUM 1.04*  1.04* 1.20* 0.98 1.04*   *  178* 228* 116* 122*   CA 9.9  9.9 9.4 8.7 8.7   *  --   --  65*   *  --   --  250*   ALKPHO 137  --   --  111   BILT 0.4  --   --  0.4   ALB 3.4  --   --  2.9*   TP 6.5  --   --  5.7*       Creatinine, Serum (mg/dL)   Date Value   04/19/2016 0.83   03/13/2015 0.86   06/22/2013 0.79     Creatinine (mg/dL)   Date Value   04/03/2024 1.04 (H)   04/03/2024 0.98   04/02/2024 1.20 (H)   02/25/2022 0.83   11/13/2021 0.62   06/15/2021 0.74   ]    Echo: EF 60%; diastolic dysfunction; pasp 50. Mild MR.      ASSESSMENT/PLAN:    Bradycardia: suspect combination of electrolyte imbalance along with medication effect.    - echo without sign abnl; diastolic dysfunction, mild MR, mild to mod pulm htn   - off ionotrope support.   - cards following  Hyponatremia: acute on chronic   - renal consulted  - follow Na; now near normal.   Hyperkalemia: a new issue  - corrected in the ER.   - renal consulted.   Asthma: no exac  - cont trelegy, prn bd.   Dm: per IM   Fen: PO   Dispo: full code. Ok to floor. [facility] will continue to follow on transfer    Shirley Martin M.D.  [facility]  Pulmonary / Critical care  4/3/2024  7:22 AM

## 2024-04-03 NOTE — PHYSICAL THERAPY NOTE
PHYSICAL THERAPY EVALUATION - INPATIENT     Room Number: 468/468-A  Evaluation Date: 4/3/2024  Type of Evaluation: Initial  Physician Order: PT Eval and Treat    Presenting Problem: Bradycardia  Co-Morbidities : AF, recurrent hyponatremia, HTN, HLD, MDD, hypothyroidism, frequent falls  Reason for Therapy: Mobility Dysfunction and Discharge Planning    PHYSICAL THERAPY ASSESSMENT   Patient is currently functioning near baseline with bed mobility, transfers, and gait.  Prior to admission, patient's baseline is ambulatory with use of a rolling walker.  Patient is requiring stand-by assist to contact guard assist as a result of the following impairments: decreased endurance/aerobic capacity, impaired standing balance, decreased muscular endurance, and increased O2 needs from baseline.  Physical Therapy will continue to follow for duration of hospitalization.    Patient will benefit from continued skilled PT Services at discharge to promote functional independence and safety with additional support and return home with OP PT.    Equipment Recommendations: Rolling walker for standing and ambulation    PLAN  PT Treatment Plan: Bed mobility;Endurance;Patient education;Family education;Gait training;Neuromuscular re-educate;Strengthening;Transfer training;Balance training  Rehab Potential : Good  Frequency (Obs): 3-5x/week  Number of Visits to Meet Established Goals: 5      CURRENT GOALS    Goal #1 Patient is able to demonstrate supine - sit EOB @ level: modified independent     Goal #2 Patient is able to demonstrate transfers Sit to/from Stand at assistance level: supervision     Goal #3 Patient is able to ambulate 150 feet with assist device: walker - rolling at assistance level: supervision     Goal #4    Goal #5    Goal #6    Goal Comments: Goals established on 4/3/2024      PHYSICAL THERAPY MEDICAL/SOCIAL HISTORY  History related to current admission: Patient is a 78 year old female transferred from [facility] on 24 with bradycardia, severe hyponatremia, and hyperkalemia.     HOME SITUATION  Type of Home: House   Home Layout: Multi-level;Able to live on main level  Stairs to Enter : 1             Lives With: Spouse;Caregiver part-time  Drives: No  Patient Owned Equipment: Rolling walker  Patient Regularly Uses: Glasses    Prior Level of [place]: The pt reports that she has began using a rolling walker recently at the insistence of her daughter, who is a physician.  Pt has had increased falls recently.  One in the shower and one in the kitchen, while preparing food.  Since the fall in the shower, the pt only showers with supervision from the caregiver.  Pt has caregiver support to prepare all meals.  Pt's family and caregiver drive.  Pt pays out of pocket for a  to come to her house 1x per week.  Additionally, pt was receiving outpatient physical therapy at [facility] in [place] for her back.    SUBJECTIVE  \"I'm better today.\"      OBJECTIVE  Precautions: Bed/chair alarm  Fall Risk: High fall risk    WEIGHT BEARING RESTRICTION  Weight Bearing Restriction: None                PAIN ASSESSMENT  Ratin          COGNITION  Overall Cognitive Status:  WFL - within functional limits    RANGE OF MOTION AND STRENGTH ASSESSMENT  Upper extremity ROM and strength are within functional limits     Lower extremity ROM is within functional limits     Lower extremity strength is within functional limits       BALANCE  Static Sitting: Fair +  Dynamic Sitting: Fair  Static Standing: Fair -  Dynamic Standing: Poor +           ACTIVITY TOLERANCE  Pulse: 62  Heart Rate Source: Monitor  Resp: 20                O2 WALK  Oxygen Therapy  SPO2% on Oxygen at Rest: 95  At rest oxygen flow (liters per minute): 2.5        AM-PAC '6-Clicks' INPATIENT SHORT FORM - BASIC MOBILITY  How much difficulty does the patient currently have...  Patient Difficulty: Turning over in bed (including adjusting bedclothes, sheets and  blankets)?: A Little   Patient Difficulty: Sitting down on and standing up from a chair with arms (e.g., wheelchair, bedside commode, etc.): A Little   Patient Difficulty: Moving from lying on back to sitting on the side of the bed?: A Little   How much help from another person does the patient currently need...   Help from Another: Moving to and from a bed to a chair (including a wheelchair)?: A Little   Help from Another: Need to walk in hospital room?: A Little   Help from Another: Climbing 3-5 steps with a railing?: A Little       AM-PAC Score:  Raw Score: 18   Approx Degree of Impairment: 46.58%   Standardized Score (AM-PAC Scale): 43.63   CMS Modifier (G-Code): CK    FUNCTIONAL ABILITY STATUS  Gait Assessment   Functional Mobility/Gait Assessment  Gait Assistance: Contact guard assist  Distance (ft): 120  Assistive Device: Rolling walker  Pattern:  (rounded shoulders)    Skilled Therapy Provided     Bed Mobility:  Rolling: SBA  Supine to sit: SBA   Sit to supine: NT     Transfer Mobility:  Sit to stand: CGA   Stand to sit: CGA  Gait = CGA    Therapist's Comments: Verbal cues provided for hand placement and sequencing with transfers and upright posture with ambulation.  Pt educated on activity expectations while hospitalized: chair for all meals, ambulate TID with nursing staff.    Exercise/Education Provided:  Bed mobility  Functional activity tolerated  Gait training  Transfer training    Patient End of Session: Up in chair;Needs met;Call light within reach;RN aware of session/findings;All patient questions and concerns addressed;Alarm set      Patient Evaluation Complexity Level:  History High - 3 or more personal factors and/or co-morbidities   Examination of body systems Moderate - addressing a total of 3 or more elements   Clinical Presentation Moderate - Evolving   Clinical Decision Making Moderate - Evolving       PT Session Time: 30 minutes  Gait Training: 10 minutes  Therapeutic Activity: 5  minutes

## 2024-04-03 NOTE — PROGRESS NOTES
CC: follow-up hospital admission bradycardia    SUBJECTIVE:  Interval History:     Feels better   She is hoping to dc tomorrow   Takes care of her sick     OBJECTIVE:  Scheduled Meds:    iron sucrose  200 mg Intravenous Daily    fluticasone-umeclidin-vilant  1 puff Inhalation Daily    cloNIDine  0.1 mg Oral TID    apixaban  5 mg Oral BID    insulin aspart  2-10 Units Subcutaneous TID AC and HS     Continuous Infusions:   PRN Meds: ipratropium-albuterol, ondansetron, metoclopramide, glucose **OR** glucose **OR** glucose-vitamin C **OR** dextrose **OR** glucose **OR** glucose **OR** glucose-vitamin C, cloNIDine, acetaminophen    PHYSICAL EXAM  Vital signs: Temp:  [97.4 °F (36.3 °C)-98.2 °F (36.8 °C)] 98 °F (36.7 °C)  Pulse:  [54-75] 67  Resp:  [10-22] 19  BP: (121-167)/() 152/49  SpO2:  [82 %-100 %] 97 %      GENERAL - NAD, AAO  EYES- sclera anicteric   HENT- normocephalic, OP - MMM  NECK - no JVD  CV- RRR  RESP - CTAB, normal resp effort  ABDOMEN- soft, NT/ND   EXT- no LE edema   PSYCH - normal mentation/ normal affect    Data Review:   Labs:   Recent Labs   Lab 04/02/24  0711 04/03/24  0401   WBC 11.2* 8.3   HGB 10.3* 8.9*   MCV 81.5 78.4*   .0 165.0       Recent Labs   Lab 04/02/24  0711 04/02/24  0818 04/02/24  1210 04/03/24  0123 04/03/24  0401 04/03/24  1149   * 128*  128* 127* 129* 131* 130*   K 4.6 4.5  4.5 4.3 4.7 4.6 4.7   CL 92* 93*  93* 92* 95* 96* 92*   CO2 24.0 25.0  25.0 26.0 29.0 32.0 33.0*   BUN 29* 29*  29* 26* 26* 24* 23   CREATSERUM 1.12* 1.04*  1.04* 1.20* 0.98 1.04* 0.97   CA 9.7 9.9  9.9 9.4 8.7 8.7 9.4   MG 1.7  --   --   --  1.8  --    PHOS 3.3  --   --   --   --   --    * 178*  178* 228* 116* 122* 159*       Recent Labs   Lab 04/02/24  0818 04/03/24  0401   * 250*   * 65*   ALB 3.4 2.9*       Recent Labs   Lab 04/02/24  1419 04/02/24  1819 04/02/24  2304 04/03/24  0809 04/03/24  1157   PGLU 193* 244* 164* 158* 152*       Conclusions:      1. Left ventricle: The cavity size was normal. Wall thickness was normal.      Systolic function was normal. The estimated ejection fraction was 60-65%, by visual assessment. Features are consistent with a pseudonormal left ventricular filling pattern, with concomitant abnormal relaxation and      increased filling pressure - grade 2 diastolic dysfunction.   2. Left atrium: The left atrial volume was mildly increased.   3. Aortic valve: There was mild to moderate regurgitation.   4. Mitral valve: There was mild regurgitation.   5. Pulmonary arteries: Systolic pressure was moderately to markedly      increased, in the range of 50mm Hg to 55mm Hg.   Impressions:  This study is compared with previous dated 4/19/2021:         ASSESSMENT/PLAN:    Patient is a 78 year old female with PMH sig for persistent AF, HTN, HLD, DM , hypothyroidism, mdd, recurrent hyponatremia here for dizziness/ chest pain.         Impression     -bradycardia     -severe hyponatremia (recurrent), Na 118 at OSH prior to transfer  -hyper K (6.8)  -metabolic acidosis, lactic acidosis  -transaminitis     -HTN  -HLD  -Persistent AF  -secondary hypercoag state     -pulm HTN     -DM 2  -hypothyroidism      -asthma   -mdd     Plan     *bradycardia - resolved  -likely medication effect  -tsh ok  -now normal HR  -echo as above  -cards consult      *hyponatremia  -acute on chronic. Improved  Renal following     *hyperkalemia  -sp treatment, now normalized     *transaminitis  -? If from hypoperfusion. Trend. No abd pain  improved     *lactic acidosis  -? If from hypoperfusion vs infection  - no abd ttp.   - blood cx ngtd  - ua iwht trace le, cx pending  Hold off abx at this time. Had ware earlier and had dysuria but now as ware was removed she feels better. If has urinary symptoms can start abx     *chronic conditions  -home meds as able     Scds      Will continue to follow while hospitalized. Please page me or the on-call hospitalist with questions  or concerns.    Calvin Robertson Hospitalist  [phone]  Answering Service: [phone]

## 2024-04-03 NOTE — PROGRESS NOTES
Cardiology Consultation Note      Rahul Paul Patient Status:  Inpatient    1945 MRN [id number]   Location [facility] 4SW-A Attending Radhika Roblero MD   Hosp Day # 1 PCP Jake Escalante MD     Reason for Consultation:  Chest pain; bradycardia    Subjective: Feels well, wants to go home. No CP or SOB on 2L O2. But per RN O2 sat drops to ~ 80% on RA.     Impression:  Chest pain - resolved, troponin negative.  Bradycardia - EKG from [place] reviewed, V rate 30s, AF or sinus arrest with junctional rhythm  PAF - currently SR  Labile HTN; multiple medication intolerances  Hyperkalemia - K 6.8  Chronic hyponatremia  Hypoxia    Plan:  Bradycardia in setting of hyperkalemia, + digoxin/diltiazem/metoprolol (daughter suspect she overdosed inadvertently)  2D echo with Doppler reviewed - moderate pulm HTN stable from , sees Dr. Landrum, suspects AGUILAR, pending sleep study as outpatient.   Holding digoxin (level was 0.09), metoprolol and diltiazem. Will avoid this combination given possibility for patient confusion/overdose in future. May resume at least metoprolol for rate control purposes in future if issues with RVR.  Daughter states multiple med intolerances - norvasc (edema), lisinopril (cough), losartan (?, but recent hyperK will avoid), hydralazine (?), thiazides (hyponatremia). Will retry low dose norvasc 2.5mg to start as SBP has been 150-160s.   Continue Eliquis for stroke prevention  Consider ischemic eval in future, though suspect presenting CP symptoms related to bradycardia   Unclear etiology for hypoxia - does not appear overtly volume overloaded clinically. Start with CXR. D- dimer from [place] reportedly negative.     Outpatient cardiologist: Dr. Salinas    History of Present Illness:  Rahul Paul is a 78 year old female who was admitted on 2024.    She developed intense fatigue, lightheadedness and R shoulder pain yesterday. She was taken to [facility]. She was  found to bradycardic with lactic acidosis, hyperk, hypona, transaminitis. Bradycardia was refractory to atropine. Received dopamine, epinephrine, insulin, glucagon.  Transferred to [place]. Her heart rate has improved with correction of electrolytes.     Her outpatient regimen:  0.1 clonidine tid with prn  Metoprolol succinate 100mg bid  Diltiazem 180  Digoxin 0.125mg daily  Furosemide 20mg daily    Cardiology consultation was requested.    Medications:        Past Medical History:   Diagnosis Date    Diabetes mellitus (HCC) 2004    Diverticulosis 2008    Dyspnea 4/13/2016    H/O Helicobacter infection     Labile hypertension 2/13/2017    Rectal prolapse     Snoring 05/03/2018    DMG HST AHI 2 snoring hypoxia PLM index 116.3    Thyroid disease     Thyroid nodule     Type II or unspecified type diabetes mellitus without mention of complication, not stated as uncontrolled     Unspecified essential hypertension        Past Surgical History:   Procedure Laterality Date    COLONOSCOPY  6/7/2013    Procedure: COLONOSCOPY;  Surgeon: Jose Doan MD;  Location:  ENDOSCOPY    COLONOSCOPY & POLYPECTOMY  6/13    polyp    GASTRO - DMG  6/13    irregular Z line    HYSTERECTOMY  2001    Complete     OTHER SURGICAL HISTORY      left knee    TOTAL ABDOM HYSTERECTOMY         Family History  family history includes Diabetes in her mother; Heart Disorder in her father.    Social History   reports that she has never smoked. She has never used smokeless tobacco. She reports that she does not drink alcohol and does not use drugs.    Allergies  Allergies   Allergen Reactions    Carvedilol DIZZINESS, FATIGUE, INSOMNIA, NAUSEA ONLY and Tightness in Chest    Hydralazine NAUSEA AND VOMITING    Lisinopril Coughing         Review of Systems:  Constitutional: negative for fevers  Eyes: negative for visual disturbance  Ears, nose, mouth, throat, and face: negative for epistaxis  Respiratory: negative for dyspnea on  exertion  Cardiovascular: negative for chest pain  Gastrointestinal: negative for melena  Genitourinary:negative for hematuria  Hematologic/lymphatic: negative for bleeding  Musculoskeletal:negative for myalgias  Neurological:+ for dizziness and headaches  Endocrine: negative for temperature intolerance      Physical Exam:  Blood pressure 153/80, pulse 68, temperature 98 °F (36.7 °C), temperature source Temporal, resp. rate 11, weight 124 lb 5.4 oz (56.4 kg), SpO2 96%, not currently breastfeeding.  Temp (24hrs), Av.8 °F (36.6 °C), Min:97.4 °F (36.3 °C), Max:98.2 °F (36.8 °C)    Wt Readings from Last 3 Encounters:   24 124 lb 5.4 oz (56.4 kg)   23 117 lb 8.1 oz (53.3 kg)   22 113 lb 6 oz (51.4 kg)       General: Awake and alert; in no acute distress  HEENT: Extraocular movements are intact; sclerae are anicteric  Neck: Supple; no JVD; no carotid bruits  Cardiac: Regular rate and rhythm; no murmurs/rubs/gallops are appreciated  Lungs: Clear to auscultation bilaterally; no accessory muscle use is noted  Abdomen: Soft, non-tender; bowel sounds are normoactive  Extremities: No clubbing or cyanosis; moves all 4 extremities normally  Psychiatric: Normal mood and affect; answers questions appropriately  Dermatologic: No rashes; normal skin turgor      Diagnostic testing:  EKG (2023): Normal sinus rhythm     Labs:   Lab Results   Component Value Date    INR 1.13 2023    INR 1.13 2023    INR 1.04 2023        Lab Results   Component Value Date    WBC 8.3 2024    HGB 8.9 2024    HCT 26.5 2024    .0 2024    CREATSERUM 0.97 2024    BUN 23 2024     2024    K 4.7 2024    CL 92 2024    CO2 33.0 2024     2024    CA 9.4 2024    ALB 2.9 2024    ALKPHO 111 2024    BILT 0.4 2024    TP 5.7 2024    AST 65 2024     2024    MG 1.8 2024    PGLU 152 2024

## 2024-04-03 NOTE — PLAN OF CARE
Rec'd report from previous RN, care assumed at 1930, pt assessed per flow sheets.  Pt awake, alert, oriented x4, pleasant.  Pt answers all questions appropriately, though does become fixated on some things and needs reminders of things discussed.  Pt remains on 2-3L nasal cannula with sats greater than 92%, lungs clear and diminished throughout.  Pt remains afebrile, SR on tele, BP stable, no edema noted.  Pt with active bowel sounds, good appetite, no bowel movement x2 days, denies constipation pain.  Pt with ware in place, adequate urine output noted.  Pt with PIV x2, dressings intact, blood return noted.  Pt reports mild discomfort, medicated with tylenol per PRN MAR.  Reviewed plan of care with pt, verbalized understanding, does require some reminders.  Call light in reach.

## 2024-04-04 VITALS
TEMPERATURE: 99 F | BODY MASS INDEX: 25 KG/M2 | DIASTOLIC BLOOD PRESSURE: 85 MMHG | RESPIRATION RATE: 16 BRPM | WEIGHT: 117.94 LBS | OXYGEN SATURATION: 93 % | SYSTOLIC BLOOD PRESSURE: 142 MMHG | HEART RATE: 67 BPM

## 2024-04-04 LAB
ALBUMIN SERPL-MCNC: 3.1 G/DL (ref 3.4–5)
ALBUMIN/GLOB SERPL: 1 {RATIO} (ref 1–2)
ALP LIVER SERPL-CCNC: 114 U/L
ALT SERPL-CCNC: 176 U/L
ANION GAP SERPL CALC-SCNC: 2 MMOL/L (ref 0–18)
AST SERPL-CCNC: 22 U/L (ref 15–37)
ATRIAL RATE: 65 BPM
BILIRUB SERPL-MCNC: 0.3 MG/DL (ref 0.1–2)
BUN BLD-MCNC: 26 MG/DL (ref 9–23)
CALCIUM BLD-MCNC: 8.8 MG/DL (ref 8.5–10.1)
CHLORIDE SERPL-SCNC: 97 MMOL/L (ref 98–112)
CO2 SERPL-SCNC: 35 MMOL/L (ref 21–32)
CREAT BLD-MCNC: 0.91 MG/DL
EGFRCR SERPLBLD CKD-EPI 2021: 64 ML/MIN/1.73M2 (ref 60–?)
ERYTHROCYTE [DISTWIDTH] IN BLOOD BY AUTOMATED COUNT: 14.7 %
GLOBULIN PLAS-MCNC: 3.2 G/DL (ref 2.8–4.4)
GLUCOSE BLD-MCNC: 133 MG/DL (ref 70–99)
GLUCOSE BLD-MCNC: 225 MG/DL (ref 70–99)
GLUCOSE BLD-MCNC: 91 MG/DL (ref 70–99)
HCT VFR BLD AUTO: 30.7 %
HGB BLD-MCNC: 9.9 G/DL
MAGNESIUM SERPL-MCNC: 1.9 MG/DL (ref 1.6–2.6)
MCH RBC QN AUTO: 26.1 PG (ref 26–34)
MCHC RBC AUTO-ENTMCNC: 32.2 G/DL (ref 31–37)
MCV RBC AUTO: 80.8 FL
OSMOLALITY SERPL CALC.SUM OF ELEC: 282 MOSM/KG (ref 275–295)
P AXIS: 54 DEGREES
P-R INTERVAL: 158 MS
PLATELET # BLD AUTO: 183 10(3)UL (ref 150–450)
POTASSIUM SERPL-SCNC: 4.4 MMOL/L (ref 3.5–5.1)
PROT SERPL-MCNC: 6.3 G/DL (ref 6.4–8.2)
Q-T INTERVAL: 402 MS
QRS DURATION: 66 MS
QTC CALCULATION (BEZET): 418 MS
R AXIS: 23 DEGREES
RBC # BLD AUTO: 3.8 X10(6)UL
SODIUM SERPL-SCNC: 134 MMOL/L (ref 136–145)
T AXIS: 51 DEGREES
VENTRICULAR RATE: 65 BPM
WBC # BLD AUTO: 8.8 X10(3) UL (ref 4–11)

## 2024-04-04 PROCEDURE — 99233 SBSQ HOSP IP/OBS HIGH 50: CPT | Performed by: INTERNAL MEDICINE

## 2024-04-04 RX ORDER — FUROSEMIDE 10 MG/ML
20 INJECTION INTRAMUSCULAR; INTRAVENOUS ONCE
Status: COMPLETED | OUTPATIENT
Start: 2024-04-04 | End: 2024-04-04

## 2024-04-04 RX ORDER — MONTELUKAST SODIUM 10 MG/1
10 TABLET ORAL DAILY
Status: DISCONTINUED | OUTPATIENT
Start: 2024-04-04 | End: 2024-04-04

## 2024-04-04 RX ORDER — METOPROLOL TARTRATE 50 MG/1
50 TABLET, FILM COATED ORAL
Status: DISCONTINUED | OUTPATIENT
Start: 2024-04-04 | End: 2024-04-04

## 2024-04-04 RX ORDER — AMLODIPINE BESYLATE 2.5 MG/1
2.5 TABLET ORAL DAILY
Qty: 30 TABLET | Refills: 2 | Status: SHIPPED | OUTPATIENT
Start: 2024-04-05 | End: 2024-07-04

## 2024-04-04 RX ORDER — MONTELUKAST SODIUM 10 MG/1
10 TABLET ORAL NIGHTLY
Status: DISCONTINUED | OUTPATIENT
Start: 2024-04-04 | End: 2024-04-04

## 2024-04-04 NOTE — OCCUPATIONAL THERAPY NOTE
OCCUPATIONAL THERAPY TREATMENT NOTE - INPATIENT     Room Number: 468/468-A  Session: 1   Number of Visits to Meet Established Goals: 4    Presenting Problem: hyponatremia, hyperkalemia    Prior Level of Function:   Patient lives with her spouse in a house. No longer drives for the past 2 years.  She recently began using a RW a few days ago at her daughter's urging due to several recent falls at home. Modified independent with RW for toileting grooming, dressing. Has supervision for showers from either her CG (4 hours/day) or her spouse's CG ( daily 8:30am-1:00pm and again from  3:00--7:00pm). Spouse pollo had a caregiver for the past 3 years.  Has a  who comes to her home, and goes to OP PT. Reported that her caregiver's spouse is the person who cooks most of the food. Patient's son and patient's CG assist with transportation. Patient reported that her daughter is physician.     ASSESSMENT   Patient demonstrates good  progress this session, goals remain in progress.    Patient continues to function below baseline with  ADLs and mobility .   Contributing factors to remaining limitations include decreased functional strength, decreased functional reach, decreased endurance, impaired standing balance, and increased O2 needs from baseline.  Next session anticipate patient to progress lower body dressing, functional standing tolerance, and energy conservation strategies.  Occupational Therapy will continue to follow patient for duration of hospitalization.    Patient continues to benefit from continued skilled OT services: at discharge to promote functional independence and safety with additional support and return home no additional therapy needs.        OT Device Recommendations: Other (Comment) (GB, DME for bathroom, life alert or smartwatch)    History: Patient is a 78 year old female admitted on 4/2/2024 home with dizziness and chest pain. Found to have hyponatremia and hyperkalemia.      Co-Morbidities : AF, recurrent hyponatremia, HTN, HLD, MDD, hypothyroidism, frequent falls    WEIGHT BEARING RESTRICTION  Weight Bearing Restriction: None                Recommendations for nursing staff:   Transfers: SBA with RW  Toileting location: toilet     TREATMENT SESSION:  Patient Start of Session: semi-supine   FUNCTIONAL TRANSFER ASSESSMENT  Sit to Stand: Edge of Bed  Edge of Bed: Stand-by Assist  Toilet Transfer: Stand-by Assist    BED MOBILITY  Supine to Sit : Supervision  Sit to Supine (OT): Supervision  Scooting: SUP    BALANCE ASSESSMENT  Static Sitting: Stand-by Assist  Sitting Bilateral: Stand-by Assist  Static Standing: Stand-by Assist  Standing Bilateral: Contact Guard Assist    FUNCTIONAL ADL ASSESSMENT  Grooming Standing: Stand-by Assist  LB Dressing Seated: Minimal Assist  Toileting Seated: Stand-by Assist  Toileting Standing: Stand-by Assist      ACTIVITY TOLERANCE: pt rec'd on 1L with O2 sats 95% at rest. Pt desats to 87% on 1L with bed mobility and EOB sitting. Pt then continues to desat with mobility and increased to 2L. O2 sats improve to 94%. Placed back on 1L at rest. RN aware.                          O2 SATURATIONS       EDUCATION PROVIDED  Patient: Role of Occupational Therapy; Plan of Care; Adaptive Equipment Recommendations; DME Recommendations; Functional Transfer Techniques; Fall Prevention; Posture/Positioning; Energy Conservation; Compensatory ADL Techniques; Proper Body Mechanics  Patient's Response to Education: Verbalized Understanding; Returned Demonstration      Equipment used: RW  Demonstrates functional use, Would benefit from additional trial     Therapist comments: Pt is pleasant and motivated to participate in therapy. Education and cueing given for energy conservation strategies, pacing, and pursed lip breathing. Continues to need cueing for safety and body mechanics with RW.   Patient End of Session: In bed;Needs met;Call light within reach;RN aware of  session/findings;All patient questions and concerns addressed;Alarm set    SUBJECTIVE  \"My daughter is a physician and she is coming from [place] right now to stay with us.\"    PAIN ASSESSMENT  Ratin           OBJECTIVE  Precautions: Bed/chair alarm    AM-PAC ‘6-Clicks’ Inpatient Daily Activity Short Form  -   Putting on and taking off regular lower body clothing?: A Little  -   Bathing (including washing, rinsing, drying)?: A Little  -   Toileting, which includes using toilet, bedpan or urinal? : A Little  -   Putting on and taking off regular upper body clothing?: None  -   Taking care of personal grooming such as brushing teeth?: None  -   Eating meals?: None    AM-PAC Score:  Score: 21  Approx Degree of Impairment: 32.79%  Standardized Score (AM-PAC Scale): 44.27    PLAN  OT Treatment Plan: Endurance training;Functional transfer training;ADL training;Energy conservation/work simplification techniques;Equipment eval/education;Patient/Family training;Compensatory technique education;Patient/Family education  Rehab Potential : Good  Frequency: 3-5x/week    OT Goals:   ADL Goals   Patient will perform grooming: with supervision and while standing at sink - MET 4/4  Patient will perform upper body dressing:  with supervision  Patient will perform lower body dressing:  with supervision and with adaptive equipment PRN  Patient will perform toileting: with supervision - MET 4/4     Functional Transfer Goals  Patient will transfer to toilet:  with supervision and good safety awareness     UE Exercise Program Goal  Patient will be supervision with bilateral AROM HEP (home exercise program).     Additional Goals  Patient will consistently demonstrate safe RW integration during ADLs and functional mobility     Therapist Goals  SBT     OT Session Time: 24 minutes  Self-Care Home Management: 10 minutes  Therapeutic Activity: 14 minutes

## 2024-04-04 NOTE — PROGRESS NOTES
Patient received 0700 on 2L NC. Patient with no complaints of pain. Weaning 02 as tolerated, patient with desaturations with RA trials to low to mid 80s. Patient aox4. Patient on board with plan of care at this time, in chair with chair alarm on at the time of patient handoff.

## 2024-04-04 NOTE — PROGRESS NOTES
Cardiology Consultation Note      Rahul Paul Patient Status:  Inpatient    1945 MRN [id number]   Location [facility] 4SW-A Attending Radhika Roblero MD   Hosp Day # 2 PCP Jake Escalante MD     Reason for Consultation:  Chest pain; bradycardia    Subjective: Feels well, wants to go home. No CP or SOB. Per RN, O2 sat to 88% on 1L walking to commode. At rest O2 sat ~ 90-95% on RA.     Impression:  Chest pain - resolved, troponin negative.   Bradycardia - EKG from Only reviewed, V rate 30s, AF or sinus arrest with junctional rhythm. Resolved. ? Inadvertent overdose of BB and CCB - per daughter was confused that night and may have taken her meds twice. Digoxin level was 0.09 only.   Hyperkalemia - K 6.8, better  PAF - currently SR  Labile HTN; multiple medication intolerances - better\  Acute on chronic hyponatremia, better, baseline  Hypoxia - CXR interstitial opacities, edema vs. Viral PNA    Plan:  2D echo with Doppler reviewed - moderate pulm HTN stable from , sees Dr. Landrum, suspects AGUILAR, pending sleep study as outpatient.   Holding digoxin, metoprolol and diltiazem. Will avoid multiple AV oma blockade combination given possibility for patient confusion/overdose in future. Will resume the metoprolol alone at lower dose 25mg BID (was on 100mg BID).   Outpatient cardiac event monitor to evaluate for recurrent bradycardia. No indication for pacer at present.   Daughter states multiple med intolerances - norvasc (edema), lisinopril (cough), losartan (?, but recent hyperK will avoid), hydralazine (?), thiazides (hyponatremia). Will retry low dose norvasc 2.5mg to start.   Continue Eliquis for stroke prevention  Consider ischemic eval in future, though suspect presenting CP symptoms related to bradycardia   Unclear etiology for hypoxia - does not appear overtly volume overloaded clinically but soft rales at bases and CXR possible interstitial edema vs. Viral, Pneumonitis. D- dimer from  [place] reportedly negative. Will give a dose IV lasix 20mg x 1. She takes 20mg PO daily at home. Check O2 sat with ambulation, may need home O2. Patient adamant she wants to go home even if she needs some home O2. Will need close outpatient follow up.     Outpatient cardiologist: Dr. Salinas    History of Present Illness:  Rahul Paul is a 78 year old female who was admitted on 4/2/2024.    She developed intense fatigue, lightheadedness and R shoulder pain yesterday. She was taken to [facility]. She was found to bradycardic with lactic acidosis, hyperk, hypona, transaminitis. Bradycardia was refractory to atropine. Received dopamine, epinephrine, insulin, glucagon.  Transferred to [place]. Her heart rate has improved with correction of electrolytes.     Her outpatient regimen:  0.1 clonidine tid with prn  Metoprolol succinate 100mg bid  Diltiazem 180  Digoxin 0.125mg daily  Furosemide 20mg daily    Cardiology consultation was requested.    Medications:        Past Medical History:   Diagnosis Date    Diabetes mellitus (HCC) 2004    Diverticulosis 2008    Dyspnea 4/13/2016    H/O Helicobacter infection     Labile hypertension 2/13/2017    Rectal prolapse     Snoring 05/03/2018    DMG HST AHI 2 snoring hypoxia PLM index 116.3    Thyroid disease     Thyroid nodule     Type II or unspecified type diabetes mellitus without mention of complication, not stated as uncontrolled     Unspecified essential hypertension        Past Surgical History:   Procedure Laterality Date    COLONOSCOPY  6/7/2013    Procedure: COLONOSCOPY;  Surgeon: Jose Doan MD;  Location:  ENDOSCOPY    COLONOSCOPY & POLYPECTOMY  6/13    polyp    GASTRO - DMG  6/13    irregular Z line    HYSTERECTOMY  2001    Complete     OTHER SURGICAL HISTORY      left knee    TOTAL ABDOM HYSTERECTOMY         Family History  family history includes Diabetes in her mother; Heart Disorder in her father.    Social History   reports that  she has never smoked. She has never used smokeless tobacco. She reports that she does not drink alcohol and does not use drugs.    Allergies  Allergies   Allergen Reactions    Carvedilol DIZZINESS, FATIGUE, INSOMNIA, NAUSEA ONLY and Tightness in Chest    Hydralazine NAUSEA AND VOMITING    Lisinopril Coughing         Review of Systems:  Constitutional: negative for fevers  Eyes: negative for visual disturbance  Ears, nose, mouth, throat, and face: negative for epistaxis  Respiratory: negative for dyspnea on exertion  Cardiovascular: negative for chest pain  Gastrointestinal: negative for melena  Genitourinary:negative for hematuria  Hematologic/lymphatic: negative for bleeding  Musculoskeletal:negative for myalgias  Neurological:+ for dizziness and headaches  Endocrine: negative for temperature intolerance      Physical Exam:  Blood pressure 147/71, pulse 83, temperature 99 °F (37.2 °C), temperature source Temporal, resp. rate 24, weight 117 lb 15.1 oz (53.5 kg), SpO2 95%, not currently breastfeeding.  Temp (24hrs), Av.2 °F (36.8 °C), Min:97.8 °F (36.6 °C), Max:99 °F (37.2 °C)    Wt Readings from Last 3 Encounters:   24 117 lb 15.1 oz (53.5 kg)   23 117 lb 8.1 oz (53.3 kg)   22 113 lb 6 oz (51.4 kg)       General: Awake and alert; in no acute distress  HEENT: Extraocular movements are intact; sclerae are anicteric  Neck: Supple; no JVD; no carotid bruits  Cardiac: Regular rate and rhythm; no murmurs/rubs/gallops are appreciated  Lungs: Clear to auscultation bilaterally; no accessory muscle use is noted  Abdomen: Soft, non-tender; bowel sounds are normoactive  Extremities: No clubbing or cyanosis; moves all 4 extremities normally  Psychiatric: Normal mood and affect; answers questions appropriately  Dermatologic: No rashes; normal skin turgor      Diagnostic testing:  EKG (2024): Normal sinus rhythm     Labs:   Lab Results   Component Value Date    INR 1.13 2023    INR 1.13 2023     INR 1.04 07/05/2023        Lab Results   Component Value Date    WBC 8.8 04/04/2024    HGB 9.9 04/04/2024    HCT 30.7 04/04/2024    .0 04/04/2024    CREATSERUM 0.91 04/04/2024    BUN 26 04/04/2024     04/04/2024    K 4.4 04/04/2024    CL 97 04/04/2024    CO2 35.0 04/04/2024    GLU 91 04/04/2024    CA 8.8 04/04/2024    ALB 3.1 04/04/2024    ALKPHO 114 04/04/2024    BILT 0.3 04/04/2024    TP 6.3 04/04/2024    AST 22 04/04/2024     04/04/2024    MG 1.9 04/04/2024    PGLU 133 04/04/2024

## 2024-04-04 NOTE — CM/SW NOTE
04/04/24 1100   CM/SW Referral Data   Referral Source Physician   Reason for Referral Discharge planning   Informant Patient   Patient Info   Patient's Current Mental Status at Time of Assessment Alert;Oriented   Patient's Home Environment House   Patient lives with Spouse/Significant other  (has dementia and 24 hr caregiver)   Patient Status Prior to Admission   Independent with ADLs and Mobility Yes   Discharge Needs   Anticipated D/C needs Medical equipment   Choice of Post-Acute Provider   Informed patient of right to choose their preferred provider Yes     Order received for home O2    Pt is a 78 year old female admitted for bradycardia, fatigue, lightheadedness, hyponatremia.  Cardiology, Nephrology consulted and per notes, bradycardia possible cause is electrolyte imbalance w/medication effect.   Notified by Dr Martin that pt discharging home today and will need Home O2.    Met w/pt at the bedside for michelle.  She reports she lives with her  in a 2 story home, but stays on one level.  She is independent with ADLs,  has dementia and he has a .      Discussed Home O2 and she is aware of plan.  Referral sent with supporting documentation and awaiting final approval    Pt reports her daughter (a physician) is flying here from [place] and will stay with her for a while upon discharge.    Discussed medication management and she described her system of organizing her meds to take correctly.  She is not interested in follow up w/HH RN to assist w/medication compliance and she is not interested in using a pill organizer.  She was able to describe the meds she takes and reasons for meds.    Plan: Home w/Home O2 with HME    Addendum  Confirmed w/HME liaison that they have everything cleared and tank can be dispensed.  Notifed Rest therapist Monika and she will deliver tank to bedside.     Notified by RN that pt now interested in HH RN.  Referral sent in [place]    Pt used [facility] in the past and  wants to use again- reserved in [place]  Called daughter to update her on plan.  Entered order for smaller portable O2 tanks per daughter request and confirmed w/HME liaison that they can deliver.    / to remain available for support and/or discharge planning.     Padmaja Trejo MBA MSN, RN CTL/  [id number]

## 2024-04-04 NOTE — PROGRESS NOTES
Pt seen and examined.    Stable for dc from IM standpoint    Full note to follow    Dw pt and RN

## 2024-04-04 NOTE — PROGRESS NOTES
[facility] Nephrology  Inpatient Follow-up    Rahul Paul Patient Status:  Inpatient    1945 MRN [id number]   Location [facility] 4SW-A Attending Radhika Roblero MD   Hosp Day # 2 PCP Jake Escalante MD       SUBJECTIVE:     Patient seen and examined at bedside.     PHYSICAL EXAM:     Vital Signs: /85 (BP Location: Left arm)   Pulse 67   Temp 98.8 °F (37.1 °C) (Temporal)   Resp 16   Wt 117 lb 15.1 oz (53.5 kg)   SpO2 93%   BMI 24.65 kg/m²   Temp (24hrs), Av.3 °F (36.8 °C), Min:97.8 °F (36.6 °C), Max:99 °F (37.2 °C)       Intake/Output Summary (Last 24 hours) at 2024 1403  Last data filed at 2024 1200  Gross per 24 hour   Intake 580 ml   Output 3500 ml   Net -2920 ml     Wt Readings from Last 3 Encounters:   24 117 lb 15.1 oz (53.5 kg)   23 117 lb 8.1 oz (53.3 kg)   22 113 lb 6 oz (51.4 kg)       General: no acute distress  HEENT: normocephalic, atraumatic  CV: RRR  Respiratory: no distress  Abdomen: soft, non-tender  Extremities: no edema bilaterally  Skin: no rashes on visible skin  Neuro: awake, alert     LABORATORY DATA:     Lab Results   Component Value Date    GLU 91 2024    BUN 26 (H) 2024    BUNCREA 24.0 (H) 2022    CREATSERUM 0.91 2024    ANIONGAP 2 2024    GFR 74 2016    GFRNAA 75 2021    GFRAA 86 2021    CA 8.8 2024    OSMOCALC 282 2024    ALKPHO 114 2024    AST 22 2024     (H) 2024    BILT 0.3 2024    TP 6.3 (L) 2024    ALB 3.1 (L) 2024    GLOBULIN 3.2 2024    AGRATIO 2.0 2015     (L) 2024    K 4.4 2024    CL 97 (L) 2024    CO2 35.0 (H) 2024     Lab Results   Component Value Date    WBC 8.8 2024    RBC 3.80 2024    HGB 9.9 (L) 2024    HCT 30.7 (L) 2024    .0 2024    MCV 80.8 2024    MCH 26.1 2024    MCHC 32.2 2024    RDW 14.7 2024     NEPRELIM 10.64 (H) 07/04/2023    NEPERCENT 81.7 07/04/2023    LYPERCENT 10.1 07/04/2023    MOPERCENT 7.4 07/04/2023    EOPERCENT 0.0 07/04/2023    BAPERCENT 0.2 07/04/2023    NE 10.64 (H) 07/04/2023    LYMABS 1.32 07/04/2023    MOABSO 0.96 07/04/2023    EOABSO 0.00 07/04/2023    BAABSO 0.02 07/04/2023     Lab Results   Component Value Date    MALBP <1.2 04/12/2021    CREUR 94.85 04/12/2021    MICROALBUMIN <0.2 01/05/2011    CREAUR 32 01/05/2011    MALBCREACALC (A) 01/05/2011      Comment:      The microalbumin value is less than 0.2 mg/dL  therefore we are unable to calculate excretion  and/or creatinine ratio.         The ADA defines abnormalities in albumin  excretion as follows:     Category         Result (mcg/mg creatinine)     Normal                    <30  Microalbuminuria            Clinical albuminuria   > OR = 300     The ADA recommends that at least two of three  specimens collected within a 3-6 month period be  abnormal before considering a patient to be  within a diagnostic category.     Lab Results   Component Value Date    COLORUR Colorless (A) 04/02/2024    CLARITY Clear 04/02/2024    SPECGRAVITY 1.005 04/02/2024    GLUUR Normal 04/02/2024    BILUR Negative 04/02/2024    KETUR Negative 04/02/2024    BLOODURINE Trace (A) 04/02/2024    PHURINE 5.5 04/02/2024    PROUR Negative 04/02/2024    UROBILINOGEN Normal 04/02/2024    NITRITE Negative 04/02/2024    LEUUR 25 (A) 04/02/2024    NMIC Microscopic not indicated 05/04/2021    WBCUR 1-5 04/02/2024    RBCUR 3-5 (A) 04/02/2024    EPIUR None Seen 04/02/2024    BACUR None Seen 04/02/2024    HYLUR NONE SEEN 01/05/2011       IMAGING:     Reviewed    ASSESSMENT:      # Non-oliguric CHUCK - improving   -Elevated creatinine (baseline around 0.9-1.0) likely pre-renal/ATN   -Urinalysis with trace hematuria, leukocyte esterase     # HTN/Volume Status  -Home medications: metoprolol 100mg BID, diltiazem 180mg daily, digoxin 0.125mg daily, clonidine 0.1mg TID,  furosemide 20mg daily     # Hyponatremia  -Chronic, averages usually around 130  -Likely secondary to tea/toast diet, polydipsia  -118 on outside hospital labs - actually 124 when corrected for hyperglycemia  -NOT taking duloxetine at home     # Hyperkalemia  -Resolved     # Anemia  -Iron deficiency     PLAN:      -Encourage PO intake with high protein diet  -Fluid restriction 1.5L daily  -Okay to resume home diuretics on discharge  -IV iron x5  -Avoid nephrotoxins and renally dose medications for creatinine clearance  -Monitor intake and output daily  -Daily weights          Okay for discharge from nephrology perspective. Will follow-up outpatient.     Thank you for allowing us to participate in the care of this patient.       Chaparrita Hernandez DO   [facility] - Nephrology

## 2024-04-04 NOTE — PROGRESS NOTES
Received patient at change of shift on 1L nc, unable to wean to RA. Alert & oriented x 4. Up to chair and bedside commode.

## 2024-04-04 NOTE — PLAN OF CARE
Assumed care of patient at change of shift. Patient alert and oriented, on 1L NC. Discharge orders received. O2 walk done per orders, see previous note. Home O2 delivered to patient. Discharge instructions and medication list provided to patient. Patient discharged via wheelchair accompanied by transport and daughter. See MAR and flowsheets for additional details.

## 2024-04-04 NOTE — DISCHARGE INSTRUCTIONS
Home Oxygen  [facility]  Main Phone: [phone]    Home Health  Sometimes managing your health at home requires assistance.  The [facility] team has recognized your preference to use [facility].  They can be reached by phone at [phone].  The fax number for your reference is [phone].. A representative from the home health agency will contact you or your family to schedule your first visit.

## 2024-04-04 NOTE — PROGRESS NOTES
[facility] Patient Status:  Inpatient    1945 MRN [id number]   Location [facility] 4SW-A Attending Calvin Parry,    Hosp Day # 2 PCP Jake Escalante MD     Pulm / Critical Care Progress Note     S: states she feels well.       Scheduled Medication:   iron sucrose  200 mg Intravenous Daily    amLODIPine  2.5 mg Oral Daily    fluticasone-umeclidin-vilant  1 puff Inhalation Daily    cloNIDine  0.1 mg Oral TID    apixaban  5 mg Oral BID    insulin aspart  2-10 Units Subcutaneous TID AC and HS     Continuous Infusing Medication:  PRN Medication:ipratropium-albuterol, ondansetron, metoclopramide, glucose **OR** glucose **OR** glucose-vitamin C **OR** dextrose **OR** glucose **OR** glucose **OR** glucose-vitamin C, cloNIDine, acetaminophen       OBJECTIVE:  Vitals:    24 0400 24 0500 24 0600 24 0700   BP: 103/82  146/56    BP Location: Left arm      Pulse: 68 72 66 68   Resp: 17 12 15 17   Temp: 98 °F (36.7 °C)      TempSrc: Temporal      SpO2: 96% 99% 96% 96%   Weight:  117 lb 15.1 oz (53.5 kg)          O2: 1-2L nc      Wt Readings from Last 3 Encounters:   24 117 lb 15.1 oz (53.5 kg)   23 117 lb 8.1 oz (53.3 kg)   22 113 lb 6 oz (51.4 kg)        I/O last 3 completed shifts:  In: 1230 [P.O.:1180; IV PIGGYBACK:50]  Out: 5400 [Urine:5400]  No intake/output data recorded.     Physical Exam:   General: alert, cooperative,  No respiratory distress.   Head: Normocephalic, without obvious abnormality, atraumatic.   Lungs: ctab   Chest wall: No tenderness or deformity.   Heart: Regular rate and rhythm, normal S1S2, no murmur.   Abdomen: soft, non-tender, non-distended, positive BS.   Extremity: no edema     Recent Labs   Lab 24  0711 24  0401 24  0406   WBC 11.2* 8.3 8.8   HGB 10.3* 8.9* 9.9*   HCT 32.6* 26.5* 30.7*   .0 165.0 183.0     No results for input(s): \"INR\" in the last 168 hours.      Recent Labs   Lab  04/02/24  0818 04/02/24  1210 04/03/24  0401 04/03/24  1149 04/04/24  0406   *  128*   < > 131* 130* 134*   K 4.5  4.5   < > 4.6 4.7 4.4   CL 93*  93*   < > 96* 92* 97*   CO2 25.0  25.0   < > 32.0 33.0* 35.0*   BUN 29*  29*   < > 24* 23 26*   CREATSERUM 1.04*  1.04*   < > 1.04* 0.97 0.91   *  178*   < > 122* 159* 91   CA 9.9  9.9   < > 8.7 9.4 8.8   *  --  65*  --  22   *  --  250*  --  176*   ALKPHO 137  --  111  --  114   BILT 0.4  --  0.4  --  0.3   ALB 3.4  --  2.9*  --  3.1*   TP 6.5  --  5.7*  --  6.3*    < > = values in this interval not displayed.       Creatinine, Serum (mg/dL)   Date Value   04/19/2016 0.83   03/13/2015 0.86   06/22/2013 0.79     Creatinine (mg/dL)   Date Value   04/04/2024 0.91   04/03/2024 0.97   04/03/2024 1.04 (H)   02/25/2022 0.83   11/13/2021 0.62   06/15/2021 0.74   ]        Cx: ngtd      ASSESSMENT/PLAN:    Bradycardia: suspect combination of electrolyte imbalance along with medication effect.    - echo without sign abnl; diastolic dysfunction, mild MR, mild to mod pulm htn   - off ionotrope support.   - cards following  Acute resp failure: suspect due to asthma, pulm htn and some fluid overload  - desat study reviewed; pt requires home O2 to prevent adverse event and rehospitalization   Hyponatremia: acute on chronic   - renal consulted  - follow Na; now near normal.   Hyperkalemia: a new issue  - corrected in the ER.   - renal consulted.   Asthma: no exac  - cont trelegy, prn bd.   Dm: per IM   Fen: PO   Dispo: full code. Ok to floor. [facility] will continue to follow on transfer       Shirley Martin M.D.  [facility]  Pulmonary / Critical care  4/4/2024  7:25 AM

## 2024-04-04 NOTE — CM/SW NOTE
04/04/24 1400   Discharge disposition   Expected discharge disposition Home-Health   Post Acute Care Provider [facility]   DME/Infusion Providers [facility]   Discharge transportation Private car     Padmaja Trejo MBA MSN, RN CTL/  [id number]

## 2024-04-04 NOTE — PLAN OF CARE
Patient weaned to room air. Desaturated to 88% on room air at rest. Placed back to 1L NC. Oxygen saturation up to 95%.     On ambulation, pt desatted to 87% on 1L NC. Oxygen increased to 2L NC with ambulation and SpO2 increased to 94%.

## 2024-04-04 NOTE — DISCHARGE SUMMARY
General Medicine Discharge Summary     Patient ID:  Rahul Paul  78 year old  [id number]  4/18/1945    Admit date: 4/2/2024    Discharge date and time:  4/4/24    Attending Physician: Elsie Jerez, *     Primary Care Physician: Jake Escalante MD     Reason for admission: bradycardia      Risk of readmission: Rahul Paul has Moderate Risk of readmission after discharge from the hospital.    Hospital Discharge Diagnoses:  as above    Lace+ Score: 66  59-90 High Risk  29-58 Medium Risk  0-28   Low Risk.    TCM Follow-Up Recommendation:  LACE 29-58: Moderate Risk of readmission after discharge from the hospital.          Discharge Condition: alive    Important follow up  -PCP Jake Escalante MD see appointments listed below    -Labs: prn  -Radiology:  prn    Sitting up in bed, on 1L NC. No cp/sob/n/v/f/c currently. Eager for dc  Vitals:    04/04/24 1000   BP: 141/55   Pulse: 75   Resp: 17   Temp:        No acute distress, alert and oriented, no accessory m use  RR  Clear lungs, ok effort  Abdomen Benign  Moves extremities         Hospital Course:    Patient is a 78 year old female with PMH sig for persistent AF, HTN, HLD, DM , hypothyroidism, mdd, recurrent hyponatremia here for dizziness/ chest pain.            *bradycardia - resolved  -likely medication effect  -tsh ok  -now normal HR  -echo noted  -cards consult, appreciate     *hyponatremia  -acute on chronic. Improved  Renal following     *hyperkalemia  -sp treatment, now normalized     *transaminitis  -? If from hypoperfusion. Trend. No abd pain  improved     *lactic acidosis  -? If from hypoperfusion vs infection  - no abd ttp.   - blood cx ngtd  - ua with trace le, cx NG  Hold off abx at this time. Had ware earlier and had dysuria but now as ware was removed she feels better.       *chronic conditions  -home meds as able     Consults: IP CONSULT TO CARDIOLOGY  IP CONSULT TO CRITICAL CARE INTENSIVIST  IP CONSULT TO NEPHROLOGY  IP CONSULT TO  SOCIAL WORK  IP CONSULT TO SOCIAL WORK    Radiology:  XR CHEST AP/PA (1 VIEW) (CPT=71045)    Result Date: 4/3/2024  PROCEDURE:  XR CHEST AP/PA (1 VIEW) (CPT=71045)  TECHNIQUE:  AP chest radiograph was obtained.  COMPARISON:  [facility] , XR, XR CHEST AP PORTABLE  (CPT=71045), 2023, 10:59 AM.  INDICATIONS:  hypoxia  PATIENT STATED HISTORY: (As transcribed by Technologist)  Patient offered no additional history at this time.                 CONCLUSION:   Stable cardiac and mediastinal contours.  Mild diffuse interstitial opacities favor mild interstitial pulmonary edema, differential including viral or atypical pneumonitis.  No airspace consolidation.  The pleural spaces are clear.   LOCATION:  [facility]      Dictated by (CST): Naeem Cheatham MD on 2024 at 5:18 PM     Finalized by (CST): Naeem Cheatham MD on 2024 at 5:19 PM       CARD ECHO 2D DOPPLER (CPT=93306)    Result Date: 2024  Transthoracic Echocardiogram Name:Rahul Paul Date: 2024 :  1945 Ht:  (58in)  BP: 172 / 88 MRN:  [id number]    Age:  78years    Wt:  (113lb) HR: 75bpm Loc:  [facility]       Gndr: F          BSA: 1.43m^2 Sonographer: Kendra Ordering:    Shirley Martin Consulting:  Amber Galo Referring:   Lamont Garcia ---------------------------------------------------------------------------- History/Indications:  Bradycardia. Rule out chf. Hyperlipidemia.  Risk factors:  Hypertension. Diabetes mellitus. ---------------------------------------------------------------------------- Procedure information:  A transthoracic complete 2D study was performed. Additional evaluation included M-mode, complete spectral Doppler, and color Doppler.  Patient status:  Inpatient.  Location:  Room 468.    Comparison was made to the study of 2021.    This was a routine study. Transthoracic echocardiography for ventricular function evaluation and assessment of valvular function. Image quality was adequate. ECG rhythm:   Normal sinus  ---------------------------------------------------------------------------- Conclusions: 1. Left ventricle: The cavity size was normal. Wall thickness was normal.    Systolic function was normal. The estimated ejection fraction was 60-65%,    by visual assessment. Features are consistent with a pseudonormal left    ventricular filling pattern, with concomitant abnormal relaxation and    increased filling pressure - grade 2 diastolic dysfunction. 2. Left atrium: The left atrial volume was mildly increased. 3. Aortic valve: There was mild to moderate regurgitation. 4. Mitral valve: There was mild regurgitation. 5. Pulmonary arteries: Systolic pressure was moderately to markedly    increased, in the range of 50mm Hg to 55mm Hg. Impressions:  This study is compared with previous dated 4/19/2021: * ---------------------------------------------------------------------------- * Findings: Left ventricle:  The cavity size was normal. Wall thickness was normal. Systolic function was normal. The estimated ejection fraction was 60-65%, by visual assessment. Features are consistent with a pseudonormal left ventricular filling pattern, with concomitant abnormal relaxation and increased filling pressure - grade 2 diastolic dysfunction. Left atrium:  The left atrial volume was mildly increased. Right ventricle:  The cavity size was normal. Systolic function was normal. Right atrium:  The atrium was normal in size. Mitral valve:  The valve was structurally normal. Leaflet separation was normal.  Doppler:  Transvalvular velocity was within the normal range. There was no evidence for stenosis. There was mild regurgitation. Aortic valve:  The valve was structurally normal. The valve was trileaflet. Cusp separation was normal.  Doppler:  Transvalvular velocity was within the normal range. There was no evidence for stenosis. There was mild to moderate regurgitation. Tricuspid valve:  The valve is structurally normal. Leaflet separation  was normal.  Doppler:  Transvalvular velocity was within the normal range. There was no evidence for stenosis. There was mild regurgitation. Pulmonic valve:   The valve is structurally normal. Cusp separation was normal.  Doppler:  Transvalvular velocity was within the normal range. There was no evidence for stenosis. There was trace regurgitation. Pericardium:   There was no pericardial effusion. Aorta: Aortic root: The aortic root was normal. Ascending aorta: The ascending aorta was normal. Pulmonary arteries: The main pulmonary artery was normal-sized. Systolic pressure was moderately to markedly increased, in the range of 50mm Hg to 55mm Hg. Systemic veins:  Central venous respirophasic diameter changes are in the normal range (>50%). Inferior vena cava: The IVC was dilated. ---------------------------------------------------------------------------- Measurements  Left ventricle                    Value        Ref  IVS thickness, ED, PLAX           0.8   cm     0.6 -                                                 0.9  LV ID, ED, PLAX                   4.5   cm     3.8 -                                                 5.2  LV ID, ES, PLAX                   3.1   cm     2.2 -                                                 3.5  LV PW thickness, ED, PLAX         0.8   cm     0.6 -                                                 0.9  IVS/LV PW ratio, ED, PLAX         0.99         --------  LV PW/LV ID ratio, ED, PLAX       0.19         --------  LV ejection fraction              61    %      54 - 74  LV e', lateral                (L) 8.3   cm/sec >=10.0  LV E/e', lateral              (H) 16           <=13  LV e', medial                 (L) 6.4   cm/sec >=7.0  LV E/e', medial                   20           --------  LV e', average                    7.3   cm/sec --------  LV E/e', average              (H) 18           <=14  Aortic root                       Value        Ref  Aortic root ID, ED                2.9    cm     2.2 -                                                 3.7  Ascending aorta                   Value        Ref  Ascending aorta ID, A-P, ED       3.3   cm     1.9 -                                                 3.5  Left atrium                       Value        Ref  LA ID, A-P, ES                (H) 4.2   cm     2.7 -                                                 3.8  LA volume, S                  (H) 60    ml     22 - 52  LA volume/bsa, S              (H) 42    ml/m^2 16 - 34  LA volume, ES, 1-p A4C        (H) 58    ml     22 - 52  LA volume, ES, 1-p A2C        (H) 60    ml     22 - 52  LA volume, ES, A/L                62    ml     --------  LA volume/bsa, ES, A/L        (H) 44    ml/m^2 16 - 34  LA/aortic root ratio              1.45         --------  Mitral valve                      Value        Ref  Mitral E-wave peak velocity       1.3   m/sec  --------  Mitral A-wave peak velocity       0.56  m/sec  --------  Mitral peak gradient, D           7     mm Hg  --------  Mitral E/A ratio, peak            2.3          --------  Pulmonary artery                  Value        Ref  PA pressure, S, DP                53    mm Hg  --------  Tricuspid valve                   Value        Ref  Tricuspid regurg peak         (H) 3.16  m/sec  <=2.8  velocity  Tricuspid peak RV-RA gradient     45    mm Hg  --------  Systemic veins                    Value        Ref  Estimated CVP                     8     mm Hg  --------  Right ventricle                   Value        Ref  RV pressure, S, DP                53    mm Hg  -------- Legend: (L)  and  (H)  sultana values outside specified reference range. ---------------------------------------------------------------------------- Prepared and electronically signed by Nel Gamez MD 04/02/2024 13:57       Operative Procedures:      Disposition: alive    Patient Instructions: Current and discharge medications reviewed with patient  Current Discharge Medication List         START taking these medications    Details   amLODIPine 2.5 MG Oral Tab Take 1 tablet (2.5 mg total) by mouth daily.      metoprolol tartrate 25 MG Oral Tab Take 1 tablet (25 mg total) by mouth 2x Daily(Beta Blocker).           CONTINUE these medications which have NOT CHANGED    Details   furosemide 20 MG Oral Tab Take 1 tablet (20 mg total) by mouth 2 (two) times daily.      cloNIDine 0.1 MG Oral Tab Take 1 tablet (0.1 mg total) by mouth 3 (three) times daily. Additional 0.1mg PRN for SBP>180      fluticasone-umeclidin-vilant (TRELEGY ELLIPTA) 200-62.5-25 MCG/ACT Inhalation Aerosol Powder, Breath Activated Inhale 1 puff into the lungs daily.      !! metFORMIN HCl 1000 MG Oral Tab Take 1 tablet (1,000 mg total) by mouth 2 (two) times daily with meals. With lunch and dinner      clonazePAM 1 MG Oral Tab Take 1 tablet (1 mg total) by mouth nightly as needed for Anxiety.      buPROPion  MG Oral Tablet 24 Hr Take 1 tablet (150 mg total) by mouth daily.      apixaban 5 MG Oral Tab Take 1 tablet (5 mg total) by mouth 2 (two) times daily.      atorvastatin 10 MG Oral Tab Take 1 tablet (10 mg total) by mouth nightly.      levothyroxine 75 MCG Oral Tab Take 1 tablet (75 mcg total) by mouth before breakfast.      Montelukast Sodium 10 MG Oral Tab Take 1 tablet (10 mg total) by mouth nightly.      Fluticasone Propionate 50 MCG/ACT Nasal Suspension 2 sprays by Each Nare route daily.      Ferrous Sulfate  (45 Fe) MG Oral Tab CR Take 1 tablet (142 mg total) by mouth daily.      gabapentin 300 MG Oral Cap Take 1 capsule (300 mg total) by mouth 3 (three) times daily.      Albuterol Sulfate HFA (PROAIR HFA) 108 (90 Base) MCG/ACT Inhalation Aero Soln Inhale 2 puffs into the lungs every 6 (six) hours as needed for Wheezing.      Olopatadine HCl (PATADAY) 0.2 % Ophthalmic Solution Place 1 drop into both eyes daily.      BIOTIN OR Take  by mouth.      Cholecalciferol (VITAMIN D-3 OR) Take  by mouth.      Calcium  Carbonate-Vit D-Min (CALCIUM 1200 OR) Take  by mouth.      !! Glucose Blood (FREESTYLE LITE TEST) In Vitro Strip 1 lancet by Finger stick route 3 (three) times daily.      !! metFORMIN 500 MG Oral Tab Take 1 tablet (500 mg total) by mouth 3 (three) times daily.      FreeStyle Lancets Does not apply Misc USE FOR TESTING THREE TIMES DAILY      pantoprazole 40 MG Oral Tab EC TAKE 1 TABLET TWICE A DAY  BEFORE MEALS      Magnesium Oxide 200 MG Oral Tab Take 200 mg by mouth daily.      Magnesium 200 MG Oral Tab Take 1 tablet (200 mg total) by mouth daily.      Blood Glucose Monitoring Suppl (FREESTYLE FREEDOM LITE) w/Device Does not apply Kit Use to test your blood sugar 3 times daily      !! Glucose Blood (FREESTYLE LITE TEST) In Vitro Strip Test 3 times daily dx E11.9, non-insulin      TRIAMCINOLONE ACETONIDE 0.025 % External Cream mix equally with cerave and apply to affected area twice a day       !! - Potential duplicate medications found. Please discuss with provider.        STOP taking these medications       digoxin 0.125 MG Oral Tab        dilTIAZem  MG Oral Capsule SR 24 Hr        metoprolol succinate 100 MG Oral Tablet 24 Hr        ALPRAZolam 1 MG Oral Tab        DULoxetine HCl 60 MG Oral Cap DR Particles        Fluticasone Furoate-Vilanterol (BREO ELLIPTA) 200-25 MCG/INH Inhalation Aerosol Powder, Breath Activated        Umeclidinium Bromide (INCRUSE ELLIPTA) 62.5 MCG/INH Inhalation Aerosol Powder, Breath Activated        Cyclobenzaprine HCl 10 MG Oral Tab              Home Medication Changes:     Activity: as directed  Diet: as directed  Wound Care:  prn  Code Status: Full Code  O2: prn    Follow-up with       Follow up with Jake Escalante  Specialty: Internal Medicine  coordinator to schedule you for next available appt. please call office or check [facility] to verify or reschedule [facility]  [address] [place] [address]  [address]  [facility] [address]  [phone]   Consultants as  directed        Total Time Coordinating Care: 35 minutes    Patient had opportunity to ask questions and state understand and agree with therapeutic plan as outlined    Thank You,  Elsie Jerez MD    [facility]  [facility] Medicine  Answering Service number: [phone]

## 2024-04-24 NOTE — H&P
Patient seen and examined independently. H and P reviewed. No changes in H and P. Risks and benefits of procedure were discussed with patient. Airway examined.  Patient is ASA class 2 and Mallampati class 2. Pt is appropriate for conscious sedation. No history of difficult airway.     The risks, benefits, indications and alternatives of right heart catheterization with possible vasodilator study (nipride, NO, etc.) were discussed. The risks include, but are not limited to: bleeding, allergic reaction, infection, vascular injury, kidney failure, stroke, myocardial infarction (heart attack), need for emergent surgery, and death. Benefits of the procedure include: symptomatic improvement, diagnosis of congestive heart failure, pulmonary hypertension, and obtaining data to help guide therapy. Alternatives to the procedure include: not performing cardiac catheterization, treatment with medications only, and observation. The patient and any accompanying family have considered the above, all questions have been answered to the best of my ability to their satisfaction, and have decided to proceed with the procedure.      Appropriate candidate for Sedation/Analgesia: Yes  Plan for Sedation reviewed: Yes    Explained Anesthesia options and attendant risks, and have determined patient is an appropriate candidate. Yes  Consent for Sedation obtained: Yes  Patient reevaluated immediately prior to Sedation/Analgesia: Yes

## 2024-04-26 RX ORDER — ESTRADIOL 0.1 MG/G
1 CREAM VAGINAL
COMMUNITY

## 2024-04-26 NOTE — PAT NURSING NOTE
PreOp Instructions     You are scheduled for: a Cardiac Procedure     Date of Procedure: 04/29/24 Monday     Arrival time: 8:00 AM     Diet Instructions: Do not eat or drink anything after midnight     Medications: Medications you are allowed to take can be taken with a sip of water the morning of your procedure     Do not take the following Blood Thinner(s): Take LAST dose of Eliquis on Saturday 4/27 PM, HOLD Eliquis on 4/28 and 4/29     Medications to Stop: Hold herbal supplements and vitamins on day of procedure    Diabetic Instructions: Do not take morning dose of your diabetic medications (Januvia, Metformin), Metformin needs to be held 24 hours prior to procedure, your last dose should be the morning dose the day before procedure     Skin Prep: Shower with antibacterial soap using a clean washcloth, prior to procedure    Driving After Procedure: If sedation is given, you WILL NOT be able to drive home. You will need a responsible adult  to drive you home., Cannot take uber or cab unless approved by physician     Discharge Teaching: Your nurse will give you specific instructions before discharge, Most people can resume normal activities in 2-3 days, Any questions, please call the physician's office      parking is available starting at 6 am or park in the [facility] parking garage at [facility]. Check in at the [facility] reception desk. Our  will be there to check you in for your procedure. Please bring your insurance cards and ID with you.                                                                                                                                      Please DO NOT respond to this message, the inbasket is not monitored for messages. For any questions, please call the physician's office.

## 2024-04-29 ENCOUNTER — HOSPITAL ENCOUNTER (OUTPATIENT)
Dept: INTERVENTIONAL RADIOLOGY/VASCULAR | Facility: HOSPITAL | Age: 79
Discharge: HOME | End: 2024-04-29
Attending: INTERNAL MEDICINE | Admitting: INTERNAL MEDICINE
Payer: MEDICARE

## 2024-04-29 VITALS
OXYGEN SATURATION: 93 % | DIASTOLIC BLOOD PRESSURE: 68 MMHG | HEIGHT: 56 IN | TEMPERATURE: 98 F | RESPIRATION RATE: 18 BRPM | SYSTOLIC BLOOD PRESSURE: 140 MMHG | WEIGHT: 120 LBS | HEART RATE: 69 BPM | BODY MASS INDEX: 26.99 KG/M2

## 2024-04-29 DIAGNOSIS — I27.21 PULMONARY ARTERIAL HYPERTENSION (HCC): ICD-10-CM

## 2024-04-29 LAB — GLUCOSE BLD-MCNC: 142 MG/DL (ref 70–99)

## 2024-04-29 PROCEDURE — 99152 MOD SED SAME PHYS/QHP 5/>YRS: CPT | Performed by: INTERNAL MEDICINE

## 2024-04-29 PROCEDURE — 4A023N6 MEASUREMENT OF CARDIAC SAMPLING AND PRESSURE, RIGHT HEART, PERCUTANEOUS APPROACH: ICD-10-PCS | Performed by: INTERNAL MEDICINE

## 2024-04-29 PROCEDURE — 93451 RIGHT HEART CATH: CPT | Performed by: INTERNAL MEDICINE

## 2024-04-29 PROCEDURE — 82962 GLUCOSE BLOOD TEST: CPT

## 2024-04-29 RX ORDER — FUROSEMIDE 40 MG/1
40 TABLET ORAL DAILY
Qty: 90 TABLET | Refills: 1 | Status: SHIPPED | OUTPATIENT
Start: 2024-04-29

## 2024-04-29 RX ORDER — NITROGLYCERIN 20 MG/100ML
INJECTION INTRAVENOUS
Status: COMPLETED
Start: 2024-04-29 | End: 2024-04-29

## 2024-04-29 RX ORDER — MIDAZOLAM HYDROCHLORIDE 1 MG/ML
INJECTION INTRAMUSCULAR; INTRAVENOUS
Status: COMPLETED
Start: 2024-04-29 | End: 2024-04-29

## 2024-04-29 RX ORDER — ASPIRIN 81 MG/1
81 TABLET, CHEWABLE ORAL ONCE
Status: COMPLETED | OUTPATIENT
Start: 2024-04-29 | End: 2024-04-29

## 2024-04-29 RX ORDER — VERAPAMIL HYDROCHLORIDE 2.5 MG/ML
INJECTION, SOLUTION INTRAVENOUS
Status: COMPLETED
Start: 2024-04-29 | End: 2024-04-29

## 2024-04-29 RX ORDER — HEPARIN SODIUM 5000 [USP'U]/ML
INJECTION, SOLUTION INTRAVENOUS; SUBCUTANEOUS
Status: COMPLETED
Start: 2024-04-29 | End: 2024-04-29

## 2024-04-29 RX ORDER — SODIUM CHLORIDE 9 MG/ML
INJECTION, SOLUTION INTRAVENOUS
Status: DISCONTINUED | OUTPATIENT
Start: 2024-04-30 | End: 2024-04-29 | Stop reason: HOSPADM

## 2024-04-29 RX ORDER — LIDOCAINE HYDROCHLORIDE 10 MG/ML
INJECTION, SOLUTION EPIDURAL; INFILTRATION; INTRACAUDAL; PERINEURAL
Status: COMPLETED
Start: 2024-04-29 | End: 2024-04-29

## 2024-04-29 RX ORDER — ASPIRIN 81 MG/1
TABLET, CHEWABLE ORAL
Status: COMPLETED
Start: 2024-04-29 | End: 2024-04-29

## 2024-04-29 RX ADMIN — ASPIRIN 81 MG: 81 TABLET, CHEWABLE ORAL at 08:20:00

## 2024-04-29 NOTE — PROGRESS NOTES
Pt s/p RHC via right brachial vein with Dr. Bhatti. Right brachial site soft with no evidence of bleeding, right radial pulse palpable. VSS. Pt awake and tolerating PO. Discharge instructions reviewed with patient, copy given and all questions answered. IV removed. Pt discharged via wheelchair to [facility], son to drive patient home.

## 2024-04-29 NOTE — PROCEDURES
OPERATIVE REPORT - DIAGNOSTIC CARDIAC CATHETERIZATION    Date of Procedure: 4/29/2024  Referring Physician: Miriam Collazo MD  Performing Physician: Leonardo Bhatti MD    Pre-Procedure Diagnosis:   Pulmonary HTN  Chronic hypoxemic respiratory insufficiency  Diastolic HF    Post-Procedure Diagnosis:   Same as pre    Procedures performed:   Right heart catheterization    Indications: This is a 79 year old female with pulm HTN and chronic hypoxia on home O2. She is referred for right heart catheterization to evaluate intracardiac filling pressures, cardiac output and for the presence of pulmonary hypertension.     Description of Procedure: Informed consent was obtained from the patient in writing. The risks and benefits of te procedure were reviewed in detail with the patient, including but not limited to the risk of allergic reaction, bleeding, vascular injury, pulmonary artery perforation, hemoptysis, cardiac injury, myocardial infarction, stroke, respiratory and/or cardiac failure and death. After a thorough discussion of these risks and benefits, the patient agreed to proceed and signed an informed consent document.    The patient was brought to the cardiac catheterization laboratory at [facility] in the fasting state. Moderate sedation was employed using 1 mg of IV Versed and 25 mcg of IV fentanyl.  A trained professional under my supervision and I observed the patient from 0935 until 0949. Local anesthesia was given in the right brachial fossa using 1% subcutaneous lidocaine. Vascular access was obtained in the right medial brachial vein by exchanging out the existing IV with a micropuncture wire and a 4/5F venous sheath. A 5F Burr-Jackie balloon flotation catheter was introduced into the venous sheath. The balloon was inflated and the catheter was advanced into the right atrium, right ventricle, pulmonary artery, and pulmonary capillary wedge positions. Measurements of pressure were taken at each position and  PA sat was obtained. Farzana cardiac outputs were measured using pulse oximetry for systemic O2 sat. All measurements were done off O2 (stopped prior to procedure). At the conclusion of the procedure, all catheters were removed and hemostasis was achieved in the right common femoral vein using manual compression.    Findings:  RA 9/6/5, RV 38/1/5, PA 40/15/27, PCW 19/28//18   Farzana CO 4.2 L/min, CI 2.9 L/min/m2, PVR 2.1 VALLADARES, MvO2 58%, pulse oximetry 91%    Conclusions  Mild pulmonary HTN in setting of mild to moderately elevated wedge pressure. Prominent V waves suggestive of significant MR, though TTE only showed mild MR.    Recommendations  Increase lasix to 40mg daily and continue GDMT for chronic HFpEF  Check BMP 1 week  F/u in office 1-2 weeks    Leonardo Bhatti MD  Interventional Cardiology  [facility]  Office: [phone]

## 2024-04-29 NOTE — DISCHARGE INSTRUCTIONS
HOME CARE INSTRUCTIONS FOLLOWING VENOUS ACCESS PROCEDURES:  RIGHT HEART CATHETERIZATION      NEED BMP DRAWN IN 1 WEEK.      Activity    DO NOT drive after the procedure. You may resume driving the following day according to the nurse or physician's instructions    Plan on resting and relaxing tonight and tomorrow    Do not lift anything over 10 pounds for the next 24 hours    Avoid sexual activity for the next 24 hours    Avoid drinking alcohol for the next 24 hours    Resume your normal activity after 24 hours, or as instructed by your physician     What is Normal?    The procedure site may appear bruised or discolored    The procedure site may be tender to the touch    There may be a small amount of drainage on the bandage     Special Instructions    The bandage is to remain in place for 24 hours    After 24 hours, you must remove the bandage. You should shower after removing the bandage, and wash the procedure site gently with soap and water. (If you choose to wear a bandage for a few days, make sure it remains clean and dry and that it is changed daily.)     When you should NOTIFY YOUR PHYSICIAN    If you have shortness of breath or a persistent cough    If you have chest pain (angina)    If you have palpitations or irregular heart beats    If you have persistent pain at the procedure site    If you experience signs of a fever, temperature >101o, chills, infection (redness, swelling, thick yellow drainage, or a foul odor from the procedure site)     Other    You may resume your present diet, unless otherwise specified by your physician    You may resume all of your medications as prescribed, unless otherwise directed by your physician. A list of your medications was provided to you at discharge    Please call your physician's office for a follow-up appointment. You should be seen in 1 to 2 weeks

## 2024-05-01 ENCOUNTER — PATIENT OUTREACH (OUTPATIENT)
Dept: CASE MANAGEMENT | Age: 79
End: 2024-05-01

## 2024-05-01 NOTE — PROGRESS NOTES
Hospital follow up.    Leonardo Bhatti MD  Cardiology  [address]  [address]  [place] [address]  [phone]  Wednesday 5/15 @12:40    Confirmed with patient's daughter.    Closing encounter.

## 2024-05-03 NOTE — PAT NURSING NOTE
Instructions given to patient's daughter, no swallowing or esophagus problems per daughter.    PreOp Instructions     You are scheduled for: a Cardiac Procedure     Date of Procedure: 05/07/24 Tuesday     Diet Instructions: Do not eat or drink anything after midnight     Medications: Medications you are allowed to take can be taken with a sip of water the morning of your procedure     Medications to Stop: Hold herbal supplements and vitamins morning of procedure     Other Medications: Morning of procedure, HOLD/do NOT take Lasix, Metformin and Januvia .     Diabetic Instructions: Do not take morning dose of your diabetic medications     Arrival Time: The day prior to your procedure you will receive a phone call before 6:00 pm with your arrival time. If you haven't received a phone call, please check your voicemail messages., If you did not receive a voice mail and it is after 6:00 pm, please call the nursing supervisor at [phone].    Driving After Procedure: If sedation is given, you WILL NOT be able to drive home. You will need a responsible adult  to drive you home., Cannot take uber or cab unless approved by physician     Discharge Teaching: Your nurse will give you specific instructions before discharge, Most people can resume normal activities in 2-3 days, Any questions, please call the physician's office      parking is available starting at 6 am or park in the [place] parking garage at [facility]. Check in at the [facility] reception desk. Our  will be there to check you in for your procedure. Please bring your insurance cards and ID with you.                                                                                                                                      Please DO NOT respond to this message, the inbasket is not monitored for messages. For any questions, please call the physician's office.

## 2024-05-07 ENCOUNTER — HOSPITAL ENCOUNTER (OUTPATIENT)
Dept: INTERVENTIONAL RADIOLOGY/VASCULAR | Facility: HOSPITAL | Age: 79
Discharge: HOME | End: 2024-05-07
Attending: INTERNAL MEDICINE | Admitting: INTERNAL MEDICINE
Payer: MEDICARE

## 2024-05-07 ENCOUNTER — HOSPITAL ENCOUNTER (OUTPATIENT)
Dept: CV DIAGNOSTICS | Facility: HOSPITAL | Age: 79
Discharge: HOME | End: 2024-05-07
Attending: INTERNAL MEDICINE
Payer: MEDICARE

## 2024-05-07 VITALS
OXYGEN SATURATION: 95 % | DIASTOLIC BLOOD PRESSURE: 57 MMHG | RESPIRATION RATE: 14 BRPM | WEIGHT: 120 LBS | TEMPERATURE: 97 F | HEART RATE: 69 BPM | HEIGHT: 56 IN | SYSTOLIC BLOOD PRESSURE: 140 MMHG | BODY MASS INDEX: 26.99 KG/M2

## 2024-05-07 DIAGNOSIS — I34.0 MITRAL REGURGITATION: ICD-10-CM

## 2024-05-07 LAB — GLUCOSE BLD-MCNC: 97 MG/DL (ref 70–99)

## 2024-05-07 PROCEDURE — 93325 DOPPLER ECHO COLOR FLOW MAPG: CPT | Performed by: INTERNAL MEDICINE

## 2024-05-07 PROCEDURE — B24BZZ4 ULTRASONOGRAPHY OF HEART WITH AORTA, TRANSESOPHAGEAL: ICD-10-PCS | Performed by: INTERNAL MEDICINE

## 2024-05-07 PROCEDURE — 99152 MOD SED SAME PHYS/QHP 5/>YRS: CPT | Performed by: INTERNAL MEDICINE

## 2024-05-07 PROCEDURE — 82962 GLUCOSE BLOOD TEST: CPT

## 2024-05-07 PROCEDURE — 93312 ECHO TRANSESOPHAGEAL: CPT | Performed by: INTERNAL MEDICINE

## 2024-05-07 PROCEDURE — 93320 DOPPLER ECHO COMPLETE: CPT | Performed by: INTERNAL MEDICINE

## 2024-05-07 RX ORDER — MIDAZOLAM HYDROCHLORIDE 1 MG/ML
INJECTION INTRAMUSCULAR; INTRAVENOUS
Status: COMPLETED
Start: 2024-05-07 | End: 2024-05-07

## 2024-05-07 RX ORDER — MIDAZOLAM HYDROCHLORIDE 1 MG/ML
1 INJECTION INTRAMUSCULAR; INTRAVENOUS EVERY 5 MIN PRN
Status: DISCONTINUED | OUTPATIENT
Start: 2024-05-07 | End: 2024-05-07

## 2024-05-07 RX ORDER — SODIUM CHLORIDE 9 MG/ML
INJECTION, SOLUTION INTRAVENOUS
Status: COMPLETED | OUTPATIENT
Start: 2024-05-08 | End: 2024-05-07

## 2024-05-07 RX ADMIN — MIDAZOLAM HYDROCHLORIDE 4 MG: 1 INJECTION INTRAMUSCULAR; INTRAVENOUS at 08:59:00

## 2024-05-07 RX ADMIN — SODIUM CHLORIDE: 9 INJECTION, SOLUTION INTRAVENOUS at 08:38:00

## 2024-05-07 NOTE — PROGRESS NOTES
Pt s/p ALLISON. Pt tolerated procedure well. Suctioned clear secretions from mouth after. VSS. Pt denied pain. Tolerated PO intake. IV d/c'd. Discharge instructions given-pt verbalized understanding. Pt to lobby via WC and son to drive home.

## 2024-05-07 NOTE — PROCEDURES
Transesophageal Echocardiogram Report    PREOPERATIVE DIAGNOSIS: Mitral regurgitation; hypoxia    POSTOPERATIVE DIAGNOSIS: Mitral regurgitation; hypoxia    PROCEDURE PERFORMED: Transesophageal echocardiogram  (CPT code 23186) with Doppler echocardiography (CPT code 16682), color flow velocity mapping (CPT code 33182) and 3D rendering with interpretation not requiring image postprocessing on a workstation (CPT code 60435). Maximum intensity projections and 3D volume rendering were utilized.     TECHNIQUE: The risks, benefits, and alternatives to transesophageal echocardiography were discussed with the patient. The risks included, but were not limited to: dental trauma, respiratory failure, esophageal perforation and death. The benefits of the procedure included: identification of a cardiac source of embolus, evaluation of valvular disease, evaluation of aortic pathology, and identification of endocarditis. Alternatives to the procedure included: not performing a transesophageal echocardiogram, treatment with medications only and observation. he verbalized understanding and agreed to proceed with the procedure.     Benzocaine spray was applied to the oropharynx for local anesthesia. The patient was placed in the left lateral decubitus position. A bite block was placed. Intravenous sedation was administered, with the patient monitored by continuous pulse oximetry and cardiac telemetry.     Intravenous sedation was administered by the nurse (trained independent observer) under my supervision from 0859 to 0920. A total of 4 mg midazolam and 50 mcg of fentanyl was administered.    Once adequate sedation was achieved, a lubricated transesophageal echocardiography probe was inserted orally. It was smoothly advanced to the upper esophageal and mid esophageal positions. Imaging was obtained. It was then advanced to the deep gastric position. Further imaging was obtained. It was then slowly withdrawn orally. No complications  were noted at the end of the procedure.    FINDINGS:  2D and 3D echocardiography:  The left ventricle appears normal in size, with mildly increased thickness, and normal systolic function. The estimated left ventricular ejection fraction is 60%. The right ventricle appears normal in size, thickness and systolic function. The visualized portions of the tricuspid valve and pulmonic valve have normal morphology and motion. There is mild mitral annular calcification, and annular dilation. The aortic valve morphology appears trileaflet and mildly thickened. There is no evidence of valvular vegetations, intracardiac masses or thrombi. The interatrial septum appeared intact by 2D imaging. The visualized portion of the thoracic aorta appeared normal. Agitated saline contrast bubble study was performed and was negative for right-to-left shunt.    Spectral, 2D color and 3D color Doppler echocardiography:  The tricuspid valve has trace regurgitation. The pulmonic valve has no evidence of stenosis or regurgitation. There is mild mitral regurgitation (ERO 0.09 cm2; regurgitant volume 16 mL). There is mild mitral stenosis (mean gradient 2 mm Hg at 67 bpm; orifice area of 2.92 cm2 by 3D planimetry). The aortic valve has mild regurgitation. The pulmonary artery systolic pressure could not be accurately measured. Left atrial appendage velocities are consistent with reduced left atrial transit function.      CONCLUSIONS:   1. There is mild mitral annular calcification, and annular dilation. There is mild mitral regurgitation (ERO 0.09 cm2; regurgitant volume 16 mL). There is mild mitral stenosis (mean gradient 2 mm Hg at 67 bpm; orifice area of 2.92 cm2 by 3D planimetry).  2. Agitated saline contrast bubble study was performed and was negative for right-to-left shunt.  3. The left ventricle appears normal in size, with mildly increased thickness, and normal systolic function. The estimated left ventricular ejection fraction is 60%.  The right ventricle appears normal in size, thickness and systolic function.    Saleem Salinas MD, FACC  5/7/2024  9:54 AM

## 2024-06-04 ENCOUNTER — ORDER TRANSCRIPTION (OUTPATIENT)
Dept: CARDIAC REHAB | Facility: HOSPITAL | Age: 79
End: 2024-06-04

## 2024-06-04 DIAGNOSIS — I27.0 PRIMARY PULMONARY HYPERTENSION (HCC): Primary | ICD-10-CM

## 2024-07-03 ENCOUNTER — CARDPULM VISIT (OUTPATIENT)
Dept: CARDIAC REHAB | Facility: HOSPITAL | Age: 79
End: 2024-07-03
Attending: INTERNAL MEDICINE
Payer: MEDICARE

## 2024-07-16 ENCOUNTER — APPOINTMENT (OUTPATIENT)
Dept: CARDIAC REHAB | Facility: HOSPITAL | Age: 79
End: 2024-07-16
Attending: INTERNAL MEDICINE
Payer: MEDICARE

## 2024-07-18 ENCOUNTER — APPOINTMENT (OUTPATIENT)
Dept: CARDIAC REHAB | Facility: HOSPITAL | Age: 79
End: 2024-07-18
Attending: INTERNAL MEDICINE
Payer: MEDICARE

## 2024-07-23 ENCOUNTER — CARDPULM VISIT (OUTPATIENT)
Dept: CARDIAC REHAB | Facility: HOSPITAL | Age: 79
End: 2024-07-23
Attending: INTERNAL MEDICINE
Payer: MEDICARE

## 2024-07-25 ENCOUNTER — CARDPULM VISIT (OUTPATIENT)
Dept: CARDIAC REHAB | Facility: HOSPITAL | Age: 79
End: 2024-07-25
Attending: INTERNAL MEDICINE
Payer: MEDICARE

## 2024-07-30 ENCOUNTER — CARDPULM VISIT (OUTPATIENT)
Dept: CARDIAC REHAB | Facility: HOSPITAL | Age: 79
End: 2024-07-30
Attending: INTERNAL MEDICINE
Payer: MEDICARE

## 2024-08-01 ENCOUNTER — CARDPULM VISIT (OUTPATIENT)
Dept: CARDIAC REHAB | Facility: HOSPITAL | Age: 79
End: 2024-08-01
Attending: INTERNAL MEDICINE
Payer: MEDICARE

## 2024-08-06 ENCOUNTER — CARDPULM VISIT (OUTPATIENT)
Dept: CARDIAC REHAB | Facility: HOSPITAL | Age: 79
End: 2024-08-06
Attending: INTERNAL MEDICINE
Payer: MEDICARE

## 2024-08-08 ENCOUNTER — CARDPULM VISIT (OUTPATIENT)
Dept: CARDIAC REHAB | Facility: HOSPITAL | Age: 79
End: 2024-08-08
Attending: INTERNAL MEDICINE
Payer: MEDICARE

## 2024-08-13 ENCOUNTER — CARDPULM VISIT (OUTPATIENT)
Dept: CARDIAC REHAB | Facility: HOSPITAL | Age: 79
End: 2024-08-13
Attending: INTERNAL MEDICINE
Payer: MEDICARE

## 2024-08-15 ENCOUNTER — CARDPULM VISIT (OUTPATIENT)
Dept: CARDIAC REHAB | Facility: HOSPITAL | Age: 79
End: 2024-08-15
Attending: INTERNAL MEDICINE
Payer: MEDICARE

## 2024-08-20 ENCOUNTER — CARDPULM VISIT (OUTPATIENT)
Dept: CARDIAC REHAB | Facility: HOSPITAL | Age: 79
End: 2024-08-20
Attending: INTERNAL MEDICINE
Payer: MEDICARE

## 2024-08-22 ENCOUNTER — APPOINTMENT (OUTPATIENT)
Dept: CARDIAC REHAB | Facility: HOSPITAL | Age: 79
End: 2024-08-22
Attending: INTERNAL MEDICINE
Payer: MEDICARE

## 2024-08-27 ENCOUNTER — APPOINTMENT (OUTPATIENT)
Dept: CARDIAC REHAB | Facility: HOSPITAL | Age: 79
End: 2024-08-27
Attending: INTERNAL MEDICINE
Payer: MEDICARE

## 2024-08-29 ENCOUNTER — CARDPULM VISIT (OUTPATIENT)
Dept: CARDIAC REHAB | Facility: HOSPITAL | Age: 79
End: 2024-08-29
Attending: INTERNAL MEDICINE
Payer: MEDICARE

## 2024-09-03 ENCOUNTER — CARDPULM VISIT (OUTPATIENT)
Dept: CARDIAC REHAB | Facility: HOSPITAL | Age: 79
End: 2024-09-03
Attending: INTERNAL MEDICINE
Payer: MEDICARE

## 2024-09-05 ENCOUNTER — CARDPULM VISIT (OUTPATIENT)
Dept: CARDIAC REHAB | Facility: HOSPITAL | Age: 79
End: 2024-09-05
Attending: INTERNAL MEDICINE
Payer: MEDICARE

## 2024-09-10 ENCOUNTER — CARDPULM VISIT (OUTPATIENT)
Dept: CARDIAC REHAB | Facility: HOSPITAL | Age: 79
End: 2024-09-10
Attending: INTERNAL MEDICINE
Payer: MEDICARE

## 2024-09-12 ENCOUNTER — APPOINTMENT (OUTPATIENT)
Dept: CARDIAC REHAB | Facility: HOSPITAL | Age: 79
End: 2024-09-12
Attending: INTERNAL MEDICINE
Payer: MEDICARE

## 2024-09-17 ENCOUNTER — APPOINTMENT (OUTPATIENT)
Dept: CARDIAC REHAB | Facility: HOSPITAL | Age: 79
End: 2024-09-17
Attending: INTERNAL MEDICINE
Payer: MEDICARE

## 2024-09-19 ENCOUNTER — CARDPULM VISIT (OUTPATIENT)
Dept: CARDIAC REHAB | Facility: HOSPITAL | Age: 79
End: 2024-09-19
Attending: INTERNAL MEDICINE
Payer: MEDICARE

## 2024-09-24 ENCOUNTER — CARDPULM VISIT (OUTPATIENT)
Dept: CARDIAC REHAB | Facility: HOSPITAL | Age: 79
End: 2024-09-24
Attending: INTERNAL MEDICINE
Payer: MEDICARE

## 2024-09-26 ENCOUNTER — CARDPULM VISIT (OUTPATIENT)
Dept: CARDIAC REHAB | Facility: HOSPITAL | Age: 79
End: 2024-09-26
Attending: INTERNAL MEDICINE
Payer: MEDICARE

## 2024-10-01 ENCOUNTER — APPOINTMENT (OUTPATIENT)
Dept: CARDIAC REHAB | Facility: HOSPITAL | Age: 79
End: 2024-10-01
Attending: INTERNAL MEDICINE
Payer: MEDICARE

## 2024-10-03 ENCOUNTER — APPOINTMENT (OUTPATIENT)
Dept: CARDIAC REHAB | Facility: HOSPITAL | Age: 79
End: 2024-10-03
Attending: INTERNAL MEDICINE
Payer: MEDICARE

## 2024-10-08 ENCOUNTER — APPOINTMENT (OUTPATIENT)
Dept: CARDIAC REHAB | Facility: HOSPITAL | Age: 79
End: 2024-10-08
Attending: INTERNAL MEDICINE
Payer: MEDICARE

## 2025-05-29 NOTE — ED PROVIDER NOTES
Patient Seen in: [facility] [place]        History  Chief Complaint   Patient presents with    Shortness Of Breath     Stated Complaint: SOB x3 days; tachycardia    Subjective:   HPI            Patient is 80-year-old female history of diabetes hypertension, A-fib among other medical problems presents ED for evaluation for generalized weakness.  Shortness of breath.  Family states she has been more lethargic more short of breath.  Complains of bodyaches fatigue.  Today felt warm to touch and so was brought to the ER.  No chest pain abdominal pain vomiting diarrhea.  No other associated symptoms.  No other complaints.      Objective:     Past Medical History:    Arrhythmia    Diabetes mellitus (HCC)    Diverticulosis    Dyspnea    Esophageal reflux    H/O Helicobacter infection    High blood pressure    High cholesterol    Labile hypertension    Rectal prolapse    Snoring    DMG HST AHI 2 snoring hypoxia PLM index 116.3    Thyroid disease    Thyroid nodule    Type II or unspecified type diabetes mellitus without mention of complication, not stated as uncontrolled    Unspecified essential hypertension              Past Surgical History:   Procedure Laterality Date    Colonoscopy  6/7/2013    Procedure: COLONOSCOPY;  Surgeon: Jose Doan MD;  Location:  ENDOSCOPY    Colonoscopy & polypectomy  6/13    polyp    Gastro - dmg  6/13    irregular Z line    Hysterectomy  2001    Complete     Other surgical history      left knee    Total abdom hysterectomy                  Social History     Socioeconomic History    Marital status:    Tobacco Use    Smoking status: Never    Smokeless tobacco: Never   Vaping Use    Vaping status: Never Used   Substance and Sexual Activity    Alcohol use: No     Alcohol/week: 0.0 standard drinks of alcohol    Drug use: Never    Sexual activity: Not Currently     Partners: Male                                Physical Exam    ED Triage Vitals [05/29/25  1146]   /70   Pulse 62   Resp 18   Temp 97.8 °F (36.6 °C)   Temp src    SpO2 96 %   O2 Device None (Room air)       Current Vitals:   Vital Signs  BP: 149/64  Pulse: 63  Resp: 18  Temp: 97.8 °F (36.6 °C)    Oxygen Therapy  SpO2: 100 %  O2 Device: None (Room air)            Physical Exam  Vitals and nursing note reviewed.   Constitutional:       General: She is not in acute distress.     Appearance: She is well-developed. She is not toxic-appearing.   HENT:      Head: Normocephalic and atraumatic.   Eyes:      General: No scleral icterus.     Conjunctiva/sclera: Conjunctivae normal.   Cardiovascular:      Rate and Rhythm: Normal rate.   Pulmonary:      Effort: Pulmonary effort is normal. No respiratory distress.      Breath sounds: Rales present.   Abdominal:      General: There is no distension.   Musculoskeletal:         General: No tenderness. Normal range of motion.      Cervical back: Normal range of motion and neck supple.   Skin:     General: Skin is warm and dry.      Findings: No rash.   Neurological:      Mental Status: She is alert and oriented to person, place, and time.      Motor: No abnormal muscle tone.      Coordination: Coordination normal.   Psychiatric:         Behavior: Behavior normal.                ED Course  Labs Reviewed   COMP METABOLIC PANEL (14) - Abnormal; Notable for the following components:       Result Value    Glucose 205 (*)     Creatinine 1.38 (*)     eGFR-Cr 38 (*)     ALT 9 (*)     Albumin 4.9 (*)     All other components within normal limits   CBC WITH DIFFERENTIAL WITH PLATELET - Abnormal; Notable for the following components:    MCHC 30.7 (*)     All other components within normal limits   PRO BETA NATRIURETIC PEPTIDE - Abnormal; Notable for the following components:    Pro-Beta Natriuretic Peptide 1,410 (*)     All other components within normal limits   URINALYSIS WITH CULTURE REFLEX - Abnormal; Notable for the following components:    Glucose Urine >=1000 (*)      Protein Urine Trace (*)     All other components within normal limits   TROPONIN I HIGH SENSITIVITY - Normal   RAPID SARS-COV-2 BY PCR - Normal   POCT FLU TEST - Normal    Narrative:     This assay is a rapid molecular in vitro test utilizing nucleic acid amplification of influenza A and B viral RNA.   RAINBOW DRAW BLUE     EKG    Rate, intervals and axes as noted on EKG Report.  Rate: 64  Rhythm: Sinus Rhythm  Reading: Normal EKG                                 MDM          -History source other than patient -relative        -Comorbidities did add complexity to the management are mentioned in the HPI above        -I personally reviewed the prior external notes and the medical record to obtain additional history reviewed discharge summary April 2024, patient-admitted for symptomatic bradycardia and electrolyte disturbances        -DDX: Includes but not limited to pneumonia, CHF, acute coronary syndrome which is a medical condition that can pose a threat to life/function        -I personally reviewed the radiographs findings and they show no acute disease  Please refer to radiology report for official interpretation      Labs Reviewed   COMP METABOLIC PANEL (14) - Abnormal; Notable for the following components:       Result Value    Glucose 205 (*)     Creatinine 1.38 (*)     eGFR-Cr 38 (*)     ALT 9 (*)     Albumin 4.9 (*)     All other components within normal limits   CBC WITH DIFFERENTIAL WITH PLATELET - Abnormal; Notable for the following components:    MCHC 30.7 (*)     All other components within normal limits   PRO BETA NATRIURETIC PEPTIDE - Abnormal; Notable for the following components:    Pro-Beta Natriuretic Peptide 1,410 (*)     All other components within normal limits   URINALYSIS WITH CULTURE REFLEX - Abnormal; Notable for the following components:    Glucose Urine >=1000 (*)     Protein Urine Trace (*)     All other components within normal limits   TROPONIN I HIGH SENSITIVITY - Normal   RAPID  SARS-COV-2 BY PCR - Normal   POCT FLU TEST - Normal    Narrative:     This assay is a rapid molecular in vitro test utilizing nucleic acid amplification of influenza A and B viral RNA.   RAINBOW DRAW BLUE     Laboratory workup demonstrates elevated BNP.  Troponin is negative.  No other significant metabolic disturbance.  Patient case discussed with cardiology and hospitalist.  Patient started on IV diuresis.  Will be admitted for further care.      Admission disposition: 5/29/2025  1:29 PM           Medical Decision Making      Disposition and Plan     Clinical Impression:  1. Dyspnea, unspecified type    2. Acute on chronic congestive heart failure, unspecified heart failure type (HCC)         Disposition:  Admit  5/29/2025  1:29 pm    Follow-up:  No follow-up provider specified.        Medications Prescribed:  Current Discharge Medication List                Supplementary Documentation:         Hospital Problems       Present on Admission  Date Reviewed: 7/18/2024          ICD-10-CM Noted POA    * (Principal) Dyspnea, unspecified type R06.00 5/29/2025 Unknown

## 2025-05-29 NOTE — ED QUICK NOTES
Orders for admission, patient is aware of plan and ready to go upstairs. Any questions, please call ED RENATE Piña at extension [phone].     Patient Covid vaccination status: Fully vaccinated     COVID Test Ordered in ED: Rapid SARS-CoV-2 by PCR    COVID Suspicion at Admission: N/A    Running Infusions: None    Mental Status/LOC at time of transport: A&Ox4    Other pertinent information: N/A  CIWA score: N/A   NIH score:  N/A

## 2025-05-29 NOTE — PROGRESS NOTES
05/29/25 1525 05/29/25 1527 05/29/25 1529   Vital Signs   Temp 97.6 °F (36.4 °C)  --   --    Temp src Oral  --   --    Pulse  --   --  59   Heart Rate Source Monitor Monitor Monitor   Cardiac Rhythm NSR NSR NSR   Resp 18 19 20   Respiratory Quality Normal Normal Normal   /58 156/67 147/63   MAP (mmHg)  --   --  88   BP Location Right arm Right arm Right arm   BP Method Automatic Automatic Automatic   Patient Position Lying Sitting Standing   Oxygen Therapy   SpO2  --   --  96 %   O2 Device None (Room air)  --   --    Pulse Oximetry Type Continuous Continuous Continuous   Oximetry Probe Site Changed Yes No No   Pulse Ox Probe Location Right hand Right hand Right hand   Height and Weight   Height  --   --  137.2 cm (4' 6\")   Weight  --   --  59.5 kg (131 lb 2.8 oz)   Scale Type  --   --  Standing scale     Orthostatic admission negataive.  Skin check: Intact, fragile d/t age. Abigail RN and Marisa PCT skin check.  AOx3, vss, ra, lungs clear, NSR, (Hx afib on eliquis), +BS, last bm today. No edema.  Up with standby assist.   [facility] hospitalists and [facility] cards notified of admission.   Lunch ordered. Bed in low position, bed alam on.

## 2025-05-29 NOTE — ED QUICK NOTES
Miriam at [facility] notified of patient's departure from PED

## 2025-05-29 NOTE — H&P
[facility]   part of [facility]    History and Physical     Rahul Paul Patient Status:  Inpatient    1945 MRN [id number]   Location [facility] 8NE-A Attending Joseline Duggan MD   Hosp Day # 0 PCP Jake Escalante MD     Chief Complaint: SOB    History of Present Illness: Rahul Paul is a 80 year old female with history of type 2 diabetes, diverticulosis, GERD, hypertension, hyperlipidemia presenting with shortness of breath.  Appears that patient's been having progressive shortness of breath over the last 3 days.  Ultimately the shortness of breath was with activity and at rest.  As result the family finally brought her to the ER for further evaluation and treatment.  Patient denies a significant productive cough.  Patient denies any significant fevers or chills.  Patient denies any chest pain or palpitations.  Patient denies any significant positive review of systems.    Past Medical History:Past Medical History[1]     Past Surgical History: Past Surgical History[2]    Social History:  reports that she has never smoked. She has never used smokeless tobacco. She reports that she does not drink alcohol and does not use drugs.no illegal drugs, , 2 children, use a walker, lives by self    Family History: Family History[3]  Mother passed  Father passed    Allergies: Allergies[4]    Medications:  Medications Ordered Prior to Encounter[5]    Review of Systems:   A comprehensive 14 point review of systems was completed.    Pertinent positives and negatives noted in the HPI.    Physical Exam:    /63 (BP Location: Right arm)   Pulse 59   Temp 97.6 °F (36.4 °C) (Oral)   Resp 20   Ht 4' 6\" (1.372 m)   Wt 131 lb 2.8 oz (59.5 kg)   SpO2 96%   BMI 31.63 kg/m²   General: No acute distress. Alert and oriented   HEENT: Normocephalic atraumatic. Moist mucous membranes. EOM-I.  Neck: No JVD. No carotid bruits.  Respiratory: Clear to auscultation bilaterally. No wheezes. No  crackles  Cardiovascular: S1, S2. Regular rate and rhythm. No murmur  Chest and Back: No tenderness or deformity.  Abdomen: Soft, nontender, nondistended.  Positive bowel sounds. No rebound, guarding   Neurologic: No focal neurological deficits. CNII-XII grossly intact. Sensation and strength intact  Musculoskeletal: Moves all extremities.  Extremities: No edema or tenderness of the LE  Integument: No new rashes or lesions.   Psychiatric: Appropriate mood and affect.      Diagnostic Data:      Labs:  Recent Labs   Lab 05/29/25  1157   WBC 7.4   HGB 12.1   MCV 86.2   .0       Recent Labs   Lab 05/29/25  1157   *   BUN 22   CREATSERUM 1.38*   CA 9.6   ALB 4.9*      K 4.0      CO2 30.0   ALKPHO 142   AST 13   ALT 9*   BILT 0.3   TP 7.5       Estimated Creatinine Clearance: 30 mL/min (A) (based on SCr of 1.38 mg/dL (H)).    No results for input(s): \"PTP\", \"INR\" in the last 168 hours.    No results for input(s): \"TROP\", \"CK\" in the last 168 hours.    Imaging: Imaging data reviewed in Epic.      ASSESSMENT / PLAN:   80 year old female with history of type 2 diabetes, diverticulosis, GERD, hypertension, hyperlipidemia presenting with shortness of breath.      SOB  -etiology uncertain   -cxr shows no acute pathology  -BNP elevated  -no leukocytosis  -no clinical signs of infection or significant fluid overload currently  -received iv lasix 40 mg x 1 in the ED, will defer further lasix to cardiology  -may be related to pulm htn  -consider pulm consult  -cardio to see pt  -check respiratory panel  -check echo    Type 2 DM  -hold home oral meds  -low dose insulin sliding scale    HTN  -sbp stable  -diltiazem  -metoprolol     HLD  -atorvastatin     GERD  -pantoprazole    Hx Atrial Fibrillation  -eliquis  -diltiazem  -metoprolol     Hypothyroidism  -levothyroxine     Quality:  DVT Prophylaxis: scd  CODE status: Full Code per chart  Mac: none    Plan of care discussed with the patient and  staff    Dispo: no discharge    MD Marcelo Medel Hospitalist  [phone]                 [1]   Past Medical History:   Arrhythmia    Diabetes mellitus (HCC)    Diverticulosis    Dyspnea    Esophageal reflux    H/O Helicobacter infection    High blood pressure    High cholesterol    Labile hypertension    Rectal prolapse    Snoring    DMG HST AHI 2 snoring hypoxia PLM index 116.3    Thyroid disease    Thyroid nodule    Type II or unspecified type diabetes mellitus without mention of complication, not stated as uncontrolled    Unspecified essential hypertension   [2]   Past Surgical History:  Procedure Laterality Date    Colonoscopy  6/7/2013    Procedure: COLONOSCOPY;  Surgeon: Jose Doan MD;  Location:  ENDOSCOPY    Colonoscopy & polypectomy  6/13    polyp    Gastro - dmg  6/13    irregular Z line    Hysterectomy  2001    Complete     Other surgical history      left knee    Total abdom hysterectomy     [3]   Family History  Problem Relation Age of Onset    Heart Disorder Father     Diabetes Mother    [4]   Allergies  Allergen Reactions    Carvedilol DIZZINESS, FATIGUE, INSOMNIA, NAUSEA ONLY and Tightness in Chest    Hydralazine NAUSEA AND VOMITING    Lisinopril Coughing    Aspirin ITCHING   [5]   No current facility-administered medications on file prior to encounter.     Current Outpatient Medications on File Prior to Encounter   Medication Sig Dispense Refill    empagliflozin (JARDIANCE) 25 MG Oral Tab Take 1 tablet (25 mg total) by mouth daily.      torsemide 20 MG Oral Tab Take 1 tablet (20 mg total) by mouth daily.      metoprolol tartrate 100 MG Oral Tab Take 1 tablet (100 mg total) by mouth 2 (two) times daily.      glimepiride 2 MG Oral Tab Take 1 tablet (2 mg total) by mouth daily with breakfast.      dilTIAZem  MG Oral Capsule SR 24 Hr Take 1 capsule (180 mg total) by mouth daily.      docusate sodium 100 MG Oral Cap Take 1 capsule (100 mg total) by mouth 2 (two) times daily.       levocetirizine 5 MG Oral Tab Take 1 tablet (5 mg total) by mouth every evening.      azelastine 0.1 % Nasal Solution by Nasal route 2 (two) times daily.      estradiol 0.1 MG/GM Vaginal Cream Place 1 g vaginally twice a week.      MAGNESIUM GLYCINATE OR Take 1 tablet by mouth at bedtime.      fluticasone-umeclidin-vilant (TRELEGY ELLIPTA) 200-62.5-25 MCG/ACT Inhalation Aerosol Powder, Breath Activated Inhale 1 puff into the lungs in the morning.      metFORMIN HCl 1000 MG Oral Tab Take 1 tablet (1,000 mg total) by mouth in the morning and 1 tablet (1,000 mg total) in the evening. Take with meals. With breakfast and dinner.      clonazePAM 1 MG Oral Tab Take 1 tablet (1 mg total) by mouth nightly as needed for Anxiety.      apixaban 5 MG Oral Tab Take 1 tablet (5 mg total) by mouth 2 (two) times daily. 60 tablet 1    atorvastatin 10 MG Oral Tab Take 1 tablet (10 mg total) by mouth nightly. 90 tablet 3    levothyroxine 75 MCG Oral Tab Take 1 tablet (75 mcg total) by mouth before breakfast.      Glucose Blood (FREESTYLE LITE TEST) In Vitro Strip 1 lancet by Finger stick route 3 (three) times daily. 300 strip 3    FreeStyle Lancets Does not apply Misc USE FOR TESTING THREE TIMES DAILY 300 each 3    pantoprazole 40 MG Oral Tab EC TAKE 1 TABLET TWICE A DAY  BEFORE MEALS 180 tablet 0    Montelukast Sodium 10 MG Oral Tab Take 1 tablet (10 mg total) by mouth nightly. 90 tablet 3    Fluticasone Propionate 50 MCG/ACT Nasal Suspension 2 sprays by Each Nare route daily. 16 g 11    Ferrous Sulfate  (45 Fe) MG Oral Tab CR Take 1 tablet (142 mg total) by mouth daily. 90 tablet 1    gabapentin 300 MG Oral Cap Take 1 capsule (300 mg total) by mouth 3 (three) times daily. 270 capsule 3    Blood Glucose Monitoring Suppl (FREESTYLE FREEDOM LITE) w/Device Does not apply Kit Use to test your blood sugar 3 times daily 1 kit 0    Glucose Blood (FREESTYLE LITE TEST) In Vitro Strip Test 3 times daily dx E11.9, non-insulin 300 strip 3     Albuterol Sulfate HFA (PROAIR HFA) 108 (90 Base) MCG/ACT Inhalation Aero Soln Inhale 2 puffs into the lungs every 6 (six) hours as needed for Wheezing. 1 Inhaler 5    TRIAMCINOLONE ACETONIDE 0.025 % External Cream mix equally with cerave and apply to affected area twice a day 454 g 0    Olopatadine HCl (PATADAY) 0.2 % Ophthalmic Solution Place 1 drop into both eyes daily. 1 Bottle 6    BIOTIN OR Take by mouth.      Cholecalciferol (VITAMIN D-3 OR) Take by mouth.      Calcium Carbonate-Vit D-Min (CALCIUM 1200 OR) Take by mouth.      furosemide 40 MG Oral Tab Take 1 tablet (40 mg total) by mouth daily. 90 tablet 1

## 2025-05-29 NOTE — ED INITIAL ASSESSMENT (HPI)
Reports increased SOB over the last 3 days, dizziness and tachycardia. Reports they did do a covid test at home which was negative but it was an  test.

## 2025-05-29 NOTE — ED QUICK NOTES
Rounding Completed    Plan of Care reviewed. Waiting for results.  Elimination needs assessed.  Provided bed adjustment..    Bed is locked and in lowest position. Call light within reach.

## 2025-05-29 NOTE — CONSULTS
Cardiology Consultation Note      Rahul Palu Patient Status:  Inpatient    1945 MRN [id number]   Location [facility] 8NE-A Attending Joseline Duggan MD   Hosp Day # 0 PCP Jake Escalante MD     Reason for consultation:  Tachycardia  MCINTOSH    Impression:  MCINTOSH  Tachycardia  Mild pulmonary HTN, Group II   RA , RV 38, PA 40/15/27, PCW    Recent ALLISON with mild MR, mild MS, no R>L shunt, and mild LVH  cMRI with mild LVH, no LGE and increase T1 values   Labile HTN  Paroxysmal AFIB: now in sinus   Asthma       Plan:  Clinically she appears euvolemic at this time.  Has mild lower extremity edema but no JVP or crackles on exam.  Will plan on getting 40 IV Lasix tomorrow morning and repeating echocardiogram at the request of daughter.  If she remains euvolemic and echo is stable we will plan on discharging her tomorrow afternoon  Continue Eliquis 5 mg twice daily  Continue diltiazem 180 mg daily  Continue metoprolol tartrate 100 mg twice daily      History of Present Illness:  Rahul Paul is a 80 year old female who presented to [facility] on 2025.    This is a patient of my partner: Dr. Monge .    The patient has a history as above who presents with shortness of breath and tachycardia.  Per patient she felt short of breath acutely at home and checked her vital signs which showed a heart rate in the 130s and diastolic blood pressure of 100.  Her daughter is a primary care doctor in [place] and while talking on the phone with the patient noted she was more short of breath than usual..  Ultimately she came to the ER.  Was found to have mildly elevated BP and PE but no other significant issues with a normal heart rate and normal blood pressure was given 40 of IV Lasix    Cardiology consultation was requested.    Medications:  Current Hospital Medications[1]    Past Medical History[2]    Past Surgical History[3]    Family History  There is no family history of sudden cardiac  death.    Social History   reports that she has never smoked. She has never used smokeless tobacco. She reports that she does not drink alcohol and does not use drugs.     Allergies  Allergies[4]      Review of Systems:  Constitutional: negative for fevers  Eyes: negative for visual disturbance  Ears, nose, mouth, throat, and face: negative for epistaxis  Respiratory: negative for dyspnea on exertion  Cardiovascular: negative for chest pain  Gastrointestinal: negative for melena  Genitourinary:negative for hematuria  Hematologic/lymphatic: negative for bleeding  Musculoskeletal:negative for myalgias  Neurological: negative for dizziness and headaches  Endocrine: negative for temperature intolerance      Physical Exam:  Blood pressure 147/63, pulse 59, temperature 97.6 °F (36.4 °C), temperature source Oral, resp. rate 20, height 4' 6\" (1.372 m), weight 131 lb 2.8 oz (59.5 kg), SpO2 96%, not currently breastfeeding.  Temp (24hrs), Av.6 °F (36.4 °C), Min:97.5 °F (36.4 °C), Max:97.8 °F (36.6 °C)    Wt Readings from Last 3 Encounters:   25 131 lb 2.8 oz (59.5 kg)   24 120 lb (54.4 kg)   24 120 lb (54.4 kg)       General: Awake and alert; in no acute distress  HEENT: Extraocular movements are intact; sclerae are anicteric; scalp is atrauamatic; no thyromegaly  Neck: Supple; no JVD; no carotid bruits  Cardiac: Regular rate and regular rhythm; no murmurs/rubs/gallops are appreciated; PMI is non-displaced; there is no evidence of a sternal heave  Lungs: Clear to auscultation bilaterally; no accessory muscle use is noted  Abdomen: Soft, non-tender; bowel sounds are normoactive; no hepatosplenomegaly  Extremities: No clubbing or cyanosis; moves all 4 extremities normally  Psychiatric: Normal mood and affect; answers questions appropriately  Dermatologic: No rashes; normal skin turgor    Diagnostic testing:    EKG: Normal sinus rhythm    Labs:   Lab Results   Component Value Date    INR 1.13 2023     INR 1.13 07/06/2023        Lab Results   Component Value Date    WBC 7.4 05/29/2025    HGB 12.1 05/29/2025    HCT 39.4 05/29/2025    .0 05/29/2025    CREATSERUM 1.38 05/29/2025    BUN 22 05/29/2025     05/29/2025    K 4.0 05/29/2025     05/29/2025    CO2 30.0 05/29/2025     05/29/2025    CA 9.6 05/29/2025    ALB 4.9 05/29/2025    ALKPHO 142 05/29/2025    BILT 0.3 05/29/2025    TP 7.5 05/29/2025    AST 13 05/29/2025    ALT 9 05/29/2025    PGLU 216 05/29/2025         Thank you for allowing our practice to participate in the care of your patient. Please do not hesitate to contact me if you have any questions.    Nel Gamez MD         [1]   No current facility-administered medications for this encounter.   [2]   Past Medical History:   Arrhythmia    Diabetes mellitus (HCC)    Diverticulosis    Dyspnea    Esophageal reflux    H/O Helicobacter infection    High blood pressure    High cholesterol    Labile hypertension    Rectal prolapse    Snoring    DMG HST AHI 2 snoring hypoxia PLM index 116.3    Thyroid disease    Thyroid nodule    Type II or unspecified type diabetes mellitus without mention of complication, not stated as uncontrolled    Unspecified essential hypertension   [3]   Past Surgical History:  Procedure Laterality Date    Colonoscopy  6/7/2013    Procedure: COLONOSCOPY;  Surgeon: Jose Doan MD;  Location:  ENDOSCOPY    Colonoscopy & polypectomy  6/13    polyp    Gastro - dmg  6/13    irregular Z line    Hysterectomy  2001    Complete     Other surgical history      left knee    Total abdom hysterectomy     [4]   Allergies  Allergen Reactions    Carvedilol DIZZINESS, FATIGUE, INSOMNIA, NAUSEA ONLY and Tightness in Chest    Hydralazine NAUSEA AND VOMITING    Lisinopril Coughing    Aspirin ITCHING

## 2025-05-30 NOTE — PLAN OF CARE
Received patient at 0730. Alert and oriented x4. Tele rhythm NSR. O2 saturation 95%. Breath sounds clear. Bed is locked and in low position. Call light and personal belongings in reach. No c/o chest pain or shortness of breath. Pt voiding with no issue. Pt ambulated in hallway with no issues. Skin dry and intact. Care plan reviewed, pt verbalizes understanding.       Problem: METABOLIC/FLUID AND ELECTROLYTES - ADULT  Goal: Glucose maintained within prescribed range  Description: INTERVENTIONS:  - Monitor Blood Glucose as ordered  - Assess for signs and symptoms of hyperglycemia and hypoglycemia  - Administer ordered medications to maintain glucose within target range  - Assess barriers to adequate nutritional intake and initiate nutrition consult as needed  - Instruct patient on self management of diabetes  Outcome: Progressing  Goal: Hemodynamic stability and optimal renal function maintained  Description: INTERVENTIONS:  - Monitor labs and assess for signs and symptoms of volume excess or deficit  - Monitor intake, output and patient weight  - Monitor urine specific gravity, serum osmolarity and serum sodium as indicated or ordered  - Monitor response to interventions for patient's volume status, including labs, urine output, blood pressure (other measures as available)  - Encourage oral intake as appropriate  - Instruct patient on fluid and nutrition restrictions as appropriate  Outcome: Progressing     Problem: HEMATOLOGIC - ADULT  Goal: Maintains hematologic stability  Description: INTERVENTIONS  - Assess for signs and symptoms of bleeding or hemorrhage  - Monitor labs and vital signs for trends  - Administer supportive blood products/factors, fluids and medications as ordered and appropriate  - Administer supportive blood products/factors as ordered and appropriate  Outcome: Progressing     Problem: GASTROINTESTINAL - ADULT  Goal: Maintains adequate nutritional intake (undernourished)  Description:  INTERVENTIONS:  - Monitor percentage of each meal consumed  - Identify factors contributing to decreased intake, treat as appropriate  - Assist with meals as needed  - Monitor I&O, WT and lab values  - Obtain nutritional consult as needed  - Optimize oral hygiene and moisture  - Encourage food from home; allow for food preferences  - Enhance eating environment  Outcome: Progressing     Problem: Impaired Activities of Daily Living  Goal: Achieve highest/safest level of independence in self care  Description: Interventions:  - Assess ability and encourage patient to participate in ADLs to maximize function  - Promote sitting position while performing ADLs such as feeding, grooming, and bathing  - Educate and encourage patient/family in tolerated functional activity level and precautions during self-care

## 2025-05-30 NOTE — PROGRESS NOTES
[facility] Cardiology Progress Note        Rahul Paul Patient Status:  Inpatient    1945 MRN [id number]   Location [facility] 8NE-A Attending Andre Ram MD   Hosp Day # 1 PCP Jake Escalante MD     Subjective:  The patient denies  chest pain and shortness of breath.  Feels much better    Medications:  Scheduled Medications[1]    Continuous Infusions:  Medication Infusions[2]      Allergies:  Allergies[3]      Objective:        Intake/Output:      Intake/Output Summary (Last 24 hours) at 2025 0856  Last data filed at 2025 0832  Gross per 24 hour   Intake 720 ml   Output 1200 ml   Net -480 ml     Wt Readings from Last 3 Encounters:   25 128 lb 12 oz (58.4 kg)   24 120 lb (54.4 kg)   24 120 lb (54.4 kg)       Physical Exam:        Vitals:    25 2046 25 2342 25 0409 25 0832   BP: 147/68 122/77 129/60 125/60   BP Location:  Right arm Right arm Right arm   Pulse: 93 75 76 71   Resp:  20 18 17   Temp:  98 °F (36.7 °C) 97.6 °F (36.4 °C) 99.1 °F (37.3 °C)   TempSrc:  Oral Oral Oral   SpO2: (!) 89% 99% 100% 96%   Weight:   128 lb 12 oz (58.4 kg)    Height:           Temp:  [97.3 °F (36.3 °C)-99.1 °F (37.3 °C)] 99.1 °F (37.3 °C)  Pulse:  [56-93] 71  Resp:  [17-20] 17  BP: (122-156)/(50-77) 125/60  SpO2:  [89 %-100 %] 96 %      Temp: 99.1 °F (37.3 °C)  Pulse: 71  Resp: 17  BP: 125/60  General:  Appears comfortable  HEENT: No focal deficits.  Neck: No JVD, carotids 2+ no bruits.  Cardiac: Regular S1S2.  No S3, S4, rub, click.  No murmur.  Lungs: Clear to auscultation and percussion.  Abdomen: Soft, non-tender.   Extremities: No LE edema.  No clubbing or cyanosis.    Neurologic: Alert and oriented, normal affect.  Skin: Warm and dry.           LABS:      HEM:  Recent Labs   Lab 257 25   WBC 7.4 7.1   HGB 12.1 10.8*   HCT 39.4 34.2*   .0 225.0       Chem:  Recent Labs   Lab 25     140   K 4.0 3.7    101   CO2 30.0 30.0   BUN 22 24*   CREATSERUM 1.38* 1.29*   CA 9.6 9.4   MG  --  2.4   * 124*       Recent Labs   Lab 05/29/25  1157   ALT 9*   AST 13   ALB 4.9*       No results for input(s): \"PT\", \"PTT\", \"INR\" in the last 168 hours.        Lab Results   Component Value Date    TROP <0.045 05/04/2021    TROP <0.046 07/06/2018    TROP <0.046 04/08/2018         Invalid input(s): \"PBNPML\"                       Diagnostics:   Telemetry: SR    EKG, 5/30/2025,     CXR, 5/29/25:      FINDINGS:  Enlarged cardiac silhouette, unchanged.  COPD changes.  No lobar consolidation.  No significant pleural fluid or pneumothorax.         Echo:      Cardiac Cath:              Impression:    Impression:  MCINTOSH.  resolved  -     net out = 640 ml  -     O2 = 1 l/min  -     BNP = 1410    Tachycardia  Mild pulmonary HTN, Group II   RA 9/6/5, RV 38/1/5, PA 40/15/27, PCW 19/28//18   Recent ALLISON with mild MR, mild MS, no R>L shunt, and mild LVH  cMRI with mild LVH, no LGE and increase T1 values   Labile HTN  Paroxysmal AFIB: now in sinus .  Eliquis.  Asthma   Diabetes         Plan:  Clinically she appears euvolemic at this time.  Has mild lower extremity edema but no JVP or crackles on exam.    Continue Eliquis 5 mg twice daily  Continue diltiazem 180 mg daily  Continue metoprolol tartrate 100 mg twice daily  5. Echo today  6. Transition back to po lasix  7. Home later today       Lamont Thompson MD  5/30/2025  8:56 AM           [1]    potassium chloride  40 mEq Oral Once    apixaban  5 mg Oral BID    atorvastatin  10 mg Oral Nightly    azelastine  2 spray Each Nare BID    dilTIAZem ER  180 mg Oral Daily    docusate sodium  100 mg Oral BID    ferrous sulfate  325 mg Oral Daily    fluticasone propionate  2 spray Each Nare Daily    fluticasone-salmeterol  1 puff Inhalation BID    umeclidinium bromide  1 puff Inhalation Daily    gabapentin  300 mg Oral BID    cetirizine  5 mg Oral Daily     levothyroxine  75 mcg Oral Before breakfast    metoprolol tartrate  100 mg Oral BID    montelukast  10 mg Oral Nightly    pantoprazole  40 mg Oral BID AC    insulin aspart  1-5 Units Subcutaneous TID AC and HS    furosemide  40 mg Intravenous Once   [2] [3]   Allergies  Allergen Reactions    Carvedilol DIZZINESS, FATIGUE, INSOMNIA, NAUSEA ONLY and Tightness in Chest    Hydralazine NAUSEA AND VOMITING    Lisinopril Coughing    Aspirin ITCHING

## 2025-05-30 NOTE — CM/SW NOTE
Patient failed inpatient criteria. Second level of review completed and supports observation. UR committee in agreement.     Discussed with Dr. Park who approves observation status. Observation order written, will follow for signature.  EM changed.  Per previous UR RN.    Email received for REY.  I spoke with patients daughter  Dima over the phone  regarding OBS status.  All questions answered.  Will deliver copy.  Yun JUAREZ RN, 05/30/25, 12:20 PM

## 2025-05-30 NOTE — PLAN OF CARE
Received patient at 1930. Patient is A&O x4. Continent bladder and bowel. Standby assist with ambulation. Denies pain, but reports HA- prn tylenol administered with relief. O2 maintained on room air. NSR On tele monitor. Bed in low position and call light within reach. Reviewed plan of care and patient verbalizes understanding.     Problem: METABOLIC/FLUID AND ELECTROLYTES - ADULT  Goal: Glucose maintained within prescribed range  Description: INTERVENTIONS:  - Monitor Blood Glucose as ordered  - Assess for signs and symptoms of hyperglycemia and hypoglycemia  - Administer ordered medications to maintain glucose within target range  - Assess barriers to adequate nutritional intake and initiate nutrition consult as needed  - Instruct patient on self management of diabetes  Outcome: Progressing  Goal: Hemodynamic stability and optimal renal function maintained  Description: INTERVENTIONS:  - Monitor labs and assess for signs and symptoms of volume excess or deficit  - Monitor intake, output and patient weight  - Monitor urine specific gravity, serum osmolarity and serum sodium as indicated or ordered  - Monitor response to interventions for patient's volume status, including labs, urine output, blood pressure (other measures as available)  - Encourage oral intake as appropriate  - Instruct patient on fluid and nutrition restrictions as appropriate  Outcome: Progressing     Problem: HEMATOLOGIC - ADULT  Goal: Maintains hematologic stability  Description: INTERVENTIONS  - Assess for signs and symptoms of bleeding or hemorrhage  - Monitor labs and vital signs for trends  - Administer supportive blood products/factors, fluids and medications as ordered and appropriate  - Administer supportive blood products/factors as ordered and appropriate  Outcome: Progressing     Problem: GASTROINTESTINAL - ADULT  Goal: Maintains adequate nutritional intake (undernourished)  Description: INTERVENTIONS:  - Monitor percentage of each  meal consumed  - Identify factors contributing to decreased intake, treat as appropriate  - Assist with meals as needed  - Monitor I&O, WT and lab values  - Obtain nutritional consult as needed  - Optimize oral hygiene and moisture  - Encourage food from home; allow for food preferences  - Enhance eating environment  Outcome: Progressing     Problem: Impaired Activities of Daily Living  Goal: Achieve highest/safest level of independence in self care  Description: Interventions:  - Assess ability and encourage patient to participate in ADLs to maximize function  - Promote sitting position while performing ADLs such as feeding, grooming, and bathing  - Educate and encourage patient/family in tolerated functional activity level and precautions during self-care    Outcome: Progressing

## 2025-05-31 NOTE — DISCHARGE SUMMARY
[place]   part of [facility]    DISCHARGE SUMMARY     Rahul Paul Patient Status:  Observation    1945 MRN [id number]   Location [facility] 8NE-A Attending No att. providers found   Hosp Day # 1 PCP Jake Escalante MD     Date of Admission: 2025  Date of Discharge: 2025  Discharge Disposition: Home or Self Care    Discharge Diagnosis: ***    History of Present Illness: ***    Brief Synopsis: ***    Lace+ Score: 63  59-90 High Risk  29-58 Medium Risk  0-28   Low Risk.    Procedures during hospitalization:   ***    Incidental or significant findings and recommendations (brief descriptions):  ***    Lab/Test results pending at Discharge:   ***    Consultants:  ***    Discharge Medication List:     Discharge Medications        CHANGE how you take these medications        Instructions Prescription details   furosemide 40 MG Tabs  Commonly known as: Lasix  What changed: Another medication with the same name was removed. Continue taking this medication, and follow the directions you see here.      Take 1 tablet (40 mg total) by mouth daily.   Quantity: 90 tablet  Refills: 1            CONTINUE taking these medications        Instructions Prescription details   albuterol 108 (90 Base) MCG/ACT Aers  Commonly known as: ProAir HFA      Inhale 2 puffs into the lungs every 6 (six) hours as needed for Wheezing.   Quantity: 1 Inhaler  Refills: 5     apixaban 5 MG Tabs  Commonly known as: Eliquis      Take 1 tablet (5 mg total) by mouth 2 (two) times daily.   Quantity: 60 tablet  Refills: 1     atorvastatin 10 MG Tabs  Commonly known as: Lipitor      Take 1 tablet (10 mg total) by mouth nightly.   Quantity: 90 tablet  Refills: 3     azelastine 0.1 % Soln  Commonly known as: Astelin      by Nasal route 2 (two) times daily.   Refills: 0     BIOTIN OR      Take by mouth.   Refills: 0     CALCIUM 1200 OR      Take by mouth.   Refills: 0     clonazePAM 1 MG Tabs  Commonly known as: KlonoPIN       Take 1 tablet (1 mg total) by mouth nightly as needed for Anxiety.   Refills: 0     dilTIAZem  MG Cp24  Commonly known as: CardIZEM CD      Take 1 capsule (180 mg total) by mouth daily.   Refills: 0     docusate sodium 100 MG Caps  Commonly known as: Colace      Take 1 capsule (100 mg total) by mouth 2 (two) times daily.   Refills: 0     estradiol 0.1 MG/GM Crea  Commonly known as: Estrace      Place 1 g vaginally twice a week.   Refills: 0     Ferrous Sulfate  (45 Fe) MG Tbcr      Take 1 tablet (142 mg total) by mouth daily.   Quantity: 90 tablet  Refills: 1     fluticasone propionate 50 MCG/ACT Susp  Commonly known as: Flonase      2 sprays by Each Nare route daily.   Quantity: 16 g  Refills: 11     FreeStyle Freedom Lite w/Device Kit      Use to test your blood sugar 3 times daily   Quantity: 1 kit  Refills: 0     FreeStyle Lancets Misc      USE FOR TESTING THREE TIMES DAILY   Quantity: 300 each  Refills: 3     FreeStyle Lite Test Strp      Test 3 times daily dx E11.9, non-insulin   Quantity: 300 strip  Refills: 3     FreeStyle Lite Test Strp      1 lancet by Finger stick route 3 (three) times daily.   Quantity: 300 strip  Refills: 3     gabapentin 300 MG Caps  Commonly known as: Neurontin      Take 1 capsule (300 mg total) by mouth in the morning and 1 capsule (300 mg total) before bedtime.   Refills: 0     glimepiride 2 MG Tabs  Commonly known as: Amaryl      Take 1 tablet (2 mg total) by mouth daily with breakfast.   Refills: 0     Jardiance 25 MG Tabs  Generic drug: empagliflozin      Take 1 tablet (25 mg total) by mouth daily.   Refills: 0     levocetirizine 5 MG Tabs  Commonly known as: Xyzal      Take 1 tablet (5 mg total) by mouth every evening.   Refills: 0     levothyroxine 75 MCG Tabs  Commonly known as: Synthroid      Take 1 tablet (75 mcg total) by mouth before breakfast.   Refills: 0     MAGNESIUM GLYCINATE OR      Take 1 tablet by mouth at bedtime.   Refills: 0     metFORMIN HCl 1000 MG  Tabs  Commonly known as: GLUCOPHAGE      Take 1 tablet (1,000 mg total) by mouth in the morning and 1 tablet (1,000 mg total) in the evening. Take with meals. With breakfast and dinner.   Refills: 0     metoprolol tartrate 100 MG Tabs  Commonly known as: Lopressor      Take 1 tablet (100 mg total) by mouth 2 (two) times daily.   Refills: 0     montelukast 10 MG Tabs  Commonly known as: Singulair      Take 1 tablet (10 mg total) by mouth nightly.   Quantity: 90 tablet  Refills: 3     Olopatadine HCl 0.2 % Soln  Commonly known as: Pataday      Place 1 drop into both eyes daily.   Quantity: 1 Bottle  Refills: 6     pantoprazole 40 MG Tbec  Commonly known as: Protonix      TAKE 1 TABLET TWICE A DAY  BEFORE MEALS   Quantity: 180 tablet  Refills: 0     Trelegy Ellipta 200-62.5-25 MCG/ACT Aepb  Generic drug: fluticasone-umeclidin-vilant      Inhale 1 puff into the lungs in the morning.   Refills: 0     triamcinolone 0.025 % Crea  Commonly known as: Kenalog      mix equally with cerave and apply to affected area twice a day   Quantity: 454 g  Refills: 0     VITAMIN D-3 OR      Take by mouth.   Refills: 0              [place] reviewed[place] ***    Follow-up appointment:   Jake Escalante MD  [address]  [address]  [place] [place] [id number] [phone]    Schedule an appointment as soon as possible for a visit in 1 week(s)      Nel Gamez MD  [address] [facility] [facility] [address]  [address]  [place] [place] [id number] [phone]    Follow up  As needed    Appointments for Next 30 Days 5/30/2025 - 6/29/2025      None            Vital signs:  Temp:  [97.6 °F (36.4 °C)-99.1 °F (37.3 °C)] 98 °F (36.7 °C)  Pulse:  [55-76] 61  Resp:  [17-20] 18  BP: (108-140)/(60-77) 108/61  SpO2:  [90 %-100 %] 90 %    Physical Exam:    General: No acute distress.   Respiratory: Clear to auscultation bilaterally. No wheezes. No rhonchi.  Cardiovascular: S1, S2. Regular rate and rhythm. No murmurs, rubs or gallops.   Abdomen: Soft, nontender, nondistended.  Positive bowel  sounds. No rebound or guarding.  Neurologic: No focal neurological deficits.   Musculoskeletal: Moves all extremities.  Extremities: No edema.  -----------------------------------------------------------------------------------------------  PATIENT DISCHARGE INSTRUCTIONS: See electronic chart    Andre Ram MD 5/30/2025    Time spent:  > 30 minutes

## (undated) NOTE — ED AVS SNAPSHOT
Naye Pascal   MRN: [id number]    Department:  THE [facility] Emergency Department in [place]   Date of Visit:  4/8/2018           Disclosure     Insurance plans vary and the physician(s) referred by the ER may not be covered by your plan.  Please contac tell this physician (or your personal doctor if your instructions are to return to your personal doctor) about any new or lasting problems. The primary care or specialist physician will see patients referred from the [facility] Emergency Department.  Jami Ferguson

## (undated) NOTE — ED AVS SNAPSHOT
Markell Garykeeper   MRN: [id number]    Department:  THE [facility] Emergency Department in [place]   Date of Visit:  7/6/2018           Disclosure     Insurance plans vary and the physician(s) referred by the ER may not be covered by your plan.  Please contac tell this physician (or your personal doctor if your instructions are to return to your personal doctor) about any new or lasting problems. The primary care or specialist physician will see patients referred from the [facility] Emergency Department.  Shelton Lombard

## (undated) NOTE — LETTER
[address], [address] [facility], [facility] [address]  [address], [address] [place] [address]   [phone] [address] [facility], [address] [place] [address], [address] [place] [address]   [phone]       May 19, 2021    Rahul Tejada Fell  1